# Patient Record
Sex: MALE | Race: WHITE | Employment: OTHER | URBAN - METROPOLITAN AREA
[De-identification: names, ages, dates, MRNs, and addresses within clinical notes are randomized per-mention and may not be internally consistent; named-entity substitution may affect disease eponyms.]

---

## 2017-06-23 ENCOUNTER — APPOINTMENT (OUTPATIENT)
Dept: GENERAL RADIOLOGY | Age: 82
End: 2017-06-23
Attending: EMERGENCY MEDICINE
Payer: MEDICARE

## 2017-06-23 ENCOUNTER — HOSPITAL ENCOUNTER (EMERGENCY)
Age: 82
Discharge: HOME | End: 2017-06-23
Attending: EMERGENCY MEDICINE | Admitting: EMERGENCY MEDICINE
Payer: MEDICARE

## 2017-06-23 VITALS
SYSTOLIC BLOOD PRESSURE: 168 MMHG | DIASTOLIC BLOOD PRESSURE: 71 MMHG | HEIGHT: 74 IN | OXYGEN SATURATION: 99 % | HEART RATE: 80 BPM | RESPIRATION RATE: 16 BRPM | TEMPERATURE: 97.9 F

## 2017-06-23 DIAGNOSIS — M19.90 ARTHRITIS: Primary | ICD-10-CM

## 2017-06-23 DIAGNOSIS — M79.605 LEG PAIN, CENTRAL, LEFT: ICD-10-CM

## 2017-06-23 LAB
ALBUMIN SERPL BCP-MCNC: 3.4 G/DL (ref 3.5–5)
ALBUMIN/GLOB SERPL: 0.9 {RATIO} (ref 1.1–2.2)
ALP SERPL-CCNC: 81 U/L (ref 45–117)
ALT SERPL-CCNC: 28 U/L (ref 12–78)
ANION GAP BLD CALC-SCNC: 7 MMOL/L (ref 5–15)
AST SERPL W P-5'-P-CCNC: 28 U/L (ref 15–37)
BASOPHILS # BLD AUTO: 0 K/UL (ref 0–0.1)
BASOPHILS # BLD: 0 % (ref 0–1)
BILIRUB SERPL-MCNC: 0.4 MG/DL (ref 0.2–1)
BUN SERPL-MCNC: 54 MG/DL (ref 6–20)
BUN/CREAT SERPL: 23 (ref 12–20)
CALCIUM SERPL-MCNC: 9.2 MG/DL (ref 8.5–10.1)
CHLORIDE SERPL-SCNC: 102 MMOL/L (ref 97–108)
CO2 SERPL-SCNC: 28 MMOL/L (ref 21–32)
CREAT SERPL-MCNC: 2.35 MG/DL (ref 0.7–1.3)
EOSINOPHIL # BLD: 0.1 K/UL (ref 0–0.4)
EOSINOPHIL NFR BLD: 2 % (ref 0–7)
ERYTHROCYTE [DISTWIDTH] IN BLOOD BY AUTOMATED COUNT: 15.7 % (ref 11.5–14.5)
GLOBULIN SER CALC-MCNC: 3.9 G/DL (ref 2–4)
GLUCOSE SERPL-MCNC: 85 MG/DL (ref 65–100)
HCT VFR BLD AUTO: 37.3 % (ref 36.6–50.3)
HGB BLD-MCNC: 12.2 G/DL (ref 12.1–17)
LYMPHOCYTES # BLD AUTO: 19 % (ref 12–49)
LYMPHOCYTES # BLD: 1.4 K/UL (ref 0.8–3.5)
MCH RBC QN AUTO: 28.8 PG (ref 26–34)
MCHC RBC AUTO-ENTMCNC: 32.7 G/DL (ref 30–36.5)
MCV RBC AUTO: 88.2 FL (ref 80–99)
MONOCYTES # BLD: 0.7 K/UL (ref 0–1)
MONOCYTES NFR BLD AUTO: 10 % (ref 5–13)
NEUTS SEG # BLD: 5.3 K/UL (ref 1.8–8)
NEUTS SEG NFR BLD AUTO: 69 % (ref 32–75)
PLATELET # BLD AUTO: 232 K/UL (ref 150–400)
POTASSIUM SERPL-SCNC: 4.1 MMOL/L (ref 3.5–5.1)
PROT SERPL-MCNC: 7.3 G/DL (ref 6.4–8.2)
RBC # BLD AUTO: 4.23 M/UL (ref 4.1–5.7)
SODIUM SERPL-SCNC: 137 MMOL/L (ref 136–145)
URATE SERPL-MCNC: 7.2 MG/DL (ref 3.5–7.2)
WBC # BLD AUTO: 7.6 K/UL (ref 4.1–11.1)

## 2017-06-23 PROCEDURE — 93971 EXTREMITY STUDY: CPT

## 2017-06-23 PROCEDURE — 99284 EMERGENCY DEPT VISIT MOD MDM: CPT

## 2017-06-23 PROCEDURE — 80053 COMPREHEN METABOLIC PANEL: CPT | Performed by: EMERGENCY MEDICINE

## 2017-06-23 PROCEDURE — 85025 COMPLETE CBC W/AUTO DIFF WBC: CPT | Performed by: EMERGENCY MEDICINE

## 2017-06-23 PROCEDURE — 74011250636 HC RX REV CODE- 250/636: Performed by: EMERGENCY MEDICINE

## 2017-06-23 PROCEDURE — 84550 ASSAY OF BLOOD/URIC ACID: CPT | Performed by: EMERGENCY MEDICINE

## 2017-06-23 PROCEDURE — 73552 X-RAY EXAM OF FEMUR 2/>: CPT

## 2017-06-23 PROCEDURE — 74011250637 HC RX REV CODE- 250/637: Performed by: EMERGENCY MEDICINE

## 2017-06-23 PROCEDURE — 36415 COLL VENOUS BLD VENIPUNCTURE: CPT | Performed by: EMERGENCY MEDICINE

## 2017-06-23 RX ORDER — HYDROCODONE BITARTRATE AND ACETAMINOPHEN 5; 325 MG/1; MG/1
1 TABLET ORAL ONCE
Status: COMPLETED | OUTPATIENT
Start: 2017-06-23 | End: 2017-06-23

## 2017-06-23 RX ORDER — METHYLPREDNISOLONE 4 MG/1
8 TABLET ORAL
Status: DISCONTINUED | OUTPATIENT
Start: 2017-06-24 | End: 2017-06-23

## 2017-06-23 RX ORDER — METHYLPREDNISOLONE 4 MG/1
8 TABLET ORAL
Status: COMPLETED | OUTPATIENT
Start: 2017-06-23 | End: 2017-06-23

## 2017-06-23 RX ORDER — METHYLPREDNISOLONE 4 MG/1
TABLET ORAL
Qty: 1 DOSE PACK | Refills: 0 | Status: ON HOLD | OUTPATIENT
Start: 2017-06-23 | End: 2017-12-28

## 2017-06-23 RX ORDER — HYDROCODONE BITARTRATE AND ACETAMINOPHEN 5; 325 MG/1; MG/1
1 TABLET ORAL
Qty: 20 TAB | Refills: 0 | Status: ON HOLD | OUTPATIENT
Start: 2017-06-23 | End: 2017-12-28

## 2017-06-23 RX ADMIN — HYDROCODONE BITARTRATE AND ACETAMINOPHEN 1 TABLET: 5; 325 TABLET ORAL at 17:28

## 2017-06-23 RX ADMIN — METHYLPREDNISOLONE 8 MG: 4 TABLET ORAL at 17:49

## 2017-06-23 NOTE — PROCEDURES
[facility]  *** FINAL REPORT ***    Name: Shannon Duncan  MRN: [id number]  : 1920  HIS Order #: [id number]  [id number] [facility] Visit #: [id number]  Date: 2017    TYPE OF TEST: Peripheral Venous Testing    REASON FOR TEST  Pain in limb    Left Leg:-  Deep venous thrombosis:           No  Superficial venous thrombosis:    No  Deep venous insufficiency:        Not examined  Superficial venous insufficiency: Not examined      INTERPRETATION/FINDINGS  PROCEDURE:  Color duplex ultrasound imaging of lower extremity veins. FINDINGS:       Right: The common femoral vein is patent and without evidence of  thrombus. Phasic flow is observed. This extremity was not otherwise  evaluated. Left:   The common femoral, deep femoral, femoral, popliteal,  posterior tibial, peroneal, and great saphenous are patent and without   evidence of thrombus;  each is fully compressible and there is no  narrowing of the flow channel on color Doppler imaging. Phasic flow  is observed in the common femoral vein. IMPRESSION:  No evidence of left lower extremity vein thrombosis. ADDITIONAL COMMENTS    I have personally reviewed the data relevant to the interpretation of  this  study.     TECHNOLOGIST: Jeremiah Guidry RVT  Signed: 2017 01:51 PM    PHYSICIAN: Sulma Coyne MD  Signed: 2017 07:41 AM

## 2017-06-23 NOTE — PROGRESS NOTES
SSED/CM consult received and appreciated. EMR reviewed. History significant for allergic rhinitis, cough, anemia, chronic kidney disease, arthritis, syncope, osteoarthritis, hypercholesterolemia, actinic keratosis, cervical disc disorder, HTN, benign prostatic hypertrophy, GERD, and gout . Patient present to the ED w/ leg pain. Case discussed w/  regarding transitional care options. Met w/patient and son/POA Rana Jeans - introduced to role of CM. Patient had a fall 2 weeks prior and lives home independently . DME includes RW. Son and daughter-n-law traveled to [place] ([address]) on 6/14/17 and drove patient back to [address] [facility]. Patient has family support including Jason Radon (son/POA). Son acknowledges pain has continued to increase and concerned about patient at night and if able to bear weight. CM discussed options from the ED if patient discharged - OP Rehab [facility] / Community Evaluation for [employer] or HH. Prior to ED visit patient has not been homebound. Celso Mancuso asked about hospitalization - CM informed patient would need to meet medical necessity for admission by Physician. Noted patient to be administered pain medication and steroid then mobility will be observed by Nursing. Previous provider includes [facility] provided son w/ brochure and informed if patient d/cd home can call facility on Monday morning and requests Home Assessment. CM provided  w/ RX for [facility]. Current address/contact will be Rana Jeans / [facility] [address] / [phone] (m). CM able to confirm PCP - Stanley Beckwith / [facility]) Internal Medicine / [phone]. [facility] Medicare A/B and [employer] are insurance providers. Care Management Interventions  PCP Verified by CM: Yes (Stanley Beckwith)  Mode of Transport at Discharge:  Other (see comment) (car)  Hospital Transport Time of Discharge: 1211  Transition of Care Consult (CM Consult): Discharge Planning  MyChart Signup: No  Discharge Durable Medical Equipment: No  Physical Therapy Consult: Yes  Occupational Therapy Consult: No  Speech Therapy Consult: No  Current Support Network: Relative's Home  Confirm Follow Up Transport: Family  Plan discussed with Pt/Family/Caregiver: Yes  Freedom of Choice Offered: Yes  Discharge Location  Discharge Placement:  (TBD)

## 2017-06-23 NOTE — ED TRIAGE NOTES
TRIAGE NOTE: Patient reports left upper leg pain and muscular tenderness after fall 2 weeks ago. Also with right hand pain, patient suspects gout or arthritis. Patient with swelling in knuckles on right 3rd-5th fingers. Patient reports not taking medication for gout, only given pain medication for when it hurts.

## 2017-06-23 NOTE — Clinical Note
Home to rest and use the medications as directed for pain and inflammation. You will need to followup with own MD in the next 4-6 days if no improvement in symptoms. Also, follow up with [facility] for PT evaluation. Take the order form with you.

## 2017-06-23 NOTE — ED NOTES
Pt ambulates around the nurse's station using cane with steady gait noted. Pt denies pain during ambulation.

## 2017-06-23 NOTE — ED PROVIDER NOTES
HPI Comments: 80 y.o. male with past medical history significant for allergic rhinitis, cough, anemia, chronic kidney disease, arthritis, syncope, osteoarthritis, hypercholesterolemia, actinic keratosis, cervical disc disorder, HTN, benign prostatic hypertrophy, GERD, and gout who presents from home with chief complaint of leg pain. Pt reports having swelling and pain in his left upper leg along with swelling in his right hand. Pt states that the swelling in his leg has been there since a fall approximately 2 weeks ago. He states that he is unable to walk or bear weight on his left leg due to the pain and swelling. Pt has been taking pain medication and Allopurinol. He denies having any back pain. No fever or chills. No prior history of similar difficulties is noted. No SOB or chest pain noted. There are no other acute medical concerns at this time. Social hx: nonsmoker, no EtOH use, no drug use  PCP: Fracisco Francisco MD    Note written by Chery Benz, as dictated by Rosaline Hook MD 12:47 PM    The history is provided by the patient. No  was used. Past Medical History:   Diagnosis Date    Actinic keratosis     Allergic rhinitis     Anemia     Arthritis     C-spine degeneration    Benign prostatic hypertrophy     Bundle branch block     Cervical disc disorder     Chronic kidney disease     \"renal disorder\"    Cough     GERD (gastroesophageal reflux disease)     Gout     Hypercholesterolemia     Hypertension     Kidney disease     Osteoarthritis     Syncope        History reviewed. No pertinent surgical history. History reviewed. No pertinent family history. Social History     Social History    Marital status:      Spouse name: N/A    Number of children: N/A    Years of education: N/A     Occupational History    Not on file.      Social History Main Topics    Smoking status: Never Smoker    Smokeless tobacco: Not on file    Alcohol use No  Drug use: No    Sexual activity: Not on file     Other Topics Concern    Not on file     Social History Narrative         ALLERGIES: Pamelor [nortriptyline]; Pcn [penicillins]; and Sulfa (sulfonamide antibiotics)    Review of Systems   Constitutional: Negative for activity change, appetite change and fatigue. HENT: Negative for ear pain, facial swelling, sore throat and trouble swallowing. Eyes: Negative for pain, discharge and visual disturbance. Respiratory: Negative for chest tightness, shortness of breath and wheezing. Cardiovascular: Negative for chest pain and palpitations. Gastrointestinal: Negative for abdominal pain, blood in stool, nausea and vomiting. Genitourinary: Negative for difficulty urinating, flank pain and hematuria. Musculoskeletal: Positive for arthralgias, joint swelling and myalgias. Negative for back pain and neck pain. Skin: Negative for color change and rash. Neurological: Negative for dizziness, weakness, numbness and headaches. Hematological: Negative for adenopathy. Does not bruise/bleed easily. Psychiatric/Behavioral: Negative for behavioral problems, confusion and sleep disturbance. All other systems reviewed and are negative. Vitals:    06/23/17 1217   BP: 119/68   Pulse: 76   Resp: 16   Temp: 97.6 °F (36.4 °C)   SpO2: 100%   Height: 6' 2\" (1.88 m)            Physical Exam   Constitutional: He is oriented to person, place, and time. He appears well-developed and well-nourished. No distress. HENT:   Head: Normocephalic and atraumatic. Nose: Nose normal.   Mouth/Throat: Oropharynx is clear and moist.   Eyes: Conjunctivae and EOM are normal. Pupils are equal, round, and reactive to light. No scleral icterus. Neck: Normal range of motion. Neck supple. No JVD present. No tracheal deviation present. No thyromegaly present. No carotid bruits noted. Cardiovascular: Normal rate, regular rhythm, normal heart sounds and intact distal pulses.   Exam reveals no gallop and no friction rub. No murmur heard. Pulmonary/Chest: Effort normal and breath sounds normal. No respiratory distress. He has no wheezes. He has no rales. He exhibits no tenderness. Abdominal: Soft. Bowel sounds are normal. He exhibits no distension and no mass. There is no tenderness. There is no rebound and no guarding. Musculoskeletal: Normal range of motion. He exhibits tenderness (Some mild tenderness in the left upper leg and left hip. No deformity. Discomfort is minimal. ). He exhibits no edema. Right hand: He exhibits swelling (fusiform swelling over proximal IP joint on 3rd and 4th finger). Left upper leg: He exhibits tenderness. Lymphadenopathy:     He has no cervical adenopathy. Neurological: He is alert and oriented to person, place, and time. He has normal reflexes. No cranial nerve deficit. Coordination normal.   Skin: Skin is warm and dry. No rash noted. No erythema. Psychiatric: He has a normal mood and affect. His behavior is normal. Judgment and thought content normal.   Nursing note and vitals reviewed.    Note written by Chery Ortiz, as dictated by Rufina Bronson MD 12:47 PM    MDM  Number of Diagnoses or Management Options  Arthritis: new and requires workup  Leg pain, central, left: new and requires workup     Amount and/or Complexity of Data Reviewed  Clinical lab tests: ordered and reviewed  Tests in the radiology section of CPT®: ordered and reviewed  Decide to obtain previous medical records or to obtain history from someone other than the patient: yes  Review and summarize past medical records: yes  Discuss the patient with other providers: yes  Independent visualization of images, tracings, or specimens: yes    Risk of Complications, Morbidity, and/or Mortality  Presenting problems: moderate  Diagnostic procedures: moderate  Management options: moderate    Patient Progress  Patient progress: stable    ED Course Procedures  PROGRESS NOTE:  2:19 PM Will consult nephrologist regarding abnormalities in patient's renal function. CONSULT NOTE:  3:33 PM Mima Bell MD spoke with Dr. Yoselin Barclay, Consult for Nephrology. Discussed available diagnostic tests and clinical findings. He is in agreement with care plans as outlined. Dr. Yoselin Barclay finds no acute changes. 4:40 PM  Still awaiting PT to see patient. Have discussed with the patient the need for medications for inflammation and pain. The family is interested in the patient receiving PT as this has helped him in the past.    He is given a dose of medrol and Norco for discomfort. Need PT for disposition. PT has been repaged   Will attempt to ambulate 20 minutes after medications. Patient ambulated without difficulty. No clear etiology for the patient's symptoms noted. Asymptomatic at this point. Will discharge home with symptomatic treatment and have the patient follow up with own MD or return if worsening of symptoms.

## 2017-06-24 ENCOUNTER — APPOINTMENT (OUTPATIENT)
Dept: CT IMAGING | Age: 82
End: 2017-06-24
Attending: EMERGENCY MEDICINE
Payer: MEDICARE

## 2017-06-24 ENCOUNTER — HOSPITAL ENCOUNTER (EMERGENCY)
Age: 82
Discharge: HOME | End: 2017-06-24
Attending: EMERGENCY MEDICINE
Payer: MEDICARE

## 2017-06-24 VITALS
RESPIRATION RATE: 22 BRPM | WEIGHT: 176 LBS | DIASTOLIC BLOOD PRESSURE: 61 MMHG | HEART RATE: 84 BPM | OXYGEN SATURATION: 94 % | BODY MASS INDEX: 22.59 KG/M2 | HEIGHT: 74 IN | TEMPERATURE: 97.4 F | SYSTOLIC BLOOD PRESSURE: 127 MMHG

## 2017-06-24 DIAGNOSIS — M54.32 SCIATICA OF LEFT SIDE: Primary | ICD-10-CM

## 2017-06-24 LAB
CK SERPL-CCNC: 79 U/L (ref 39–308)
ERYTHROCYTE [SEDIMENTATION RATE] IN BLOOD: 45 MM/HR (ref 0–20)

## 2017-06-24 PROCEDURE — 73700 CT LOWER EXTREMITY W/O DYE: CPT

## 2017-06-24 PROCEDURE — 82550 ASSAY OF CK (CPK): CPT | Performed by: EMERGENCY MEDICINE

## 2017-06-24 PROCEDURE — 96375 TX/PRO/DX INJ NEW DRUG ADDON: CPT

## 2017-06-24 PROCEDURE — 85652 RBC SED RATE AUTOMATED: CPT | Performed by: EMERGENCY MEDICINE

## 2017-06-24 PROCEDURE — 96374 THER/PROPH/DIAG INJ IV PUSH: CPT

## 2017-06-24 PROCEDURE — 74011000250 HC RX REV CODE- 250: Performed by: EMERGENCY MEDICINE

## 2017-06-24 PROCEDURE — 99285 EMERGENCY DEPT VISIT HI MDM: CPT

## 2017-06-24 PROCEDURE — 36415 COLL VENOUS BLD VENIPUNCTURE: CPT | Performed by: EMERGENCY MEDICINE

## 2017-06-24 PROCEDURE — 74011250636 HC RX REV CODE- 250/636: Performed by: EMERGENCY MEDICINE

## 2017-06-24 PROCEDURE — 93926 LOWER EXTREMITY STUDY: CPT

## 2017-06-24 RX ORDER — HYDROMORPHONE HYDROCHLORIDE 1 MG/ML
0.5 INJECTION, SOLUTION INTRAMUSCULAR; INTRAVENOUS; SUBCUTANEOUS
Status: COMPLETED | OUTPATIENT
Start: 2017-06-24 | End: 2017-06-24

## 2017-06-24 RX ORDER — MORPHINE SULFATE 4 MG/ML
4 INJECTION, SOLUTION INTRAMUSCULAR; INTRAVENOUS ONCE
Status: COMPLETED | OUTPATIENT
Start: 2017-06-24 | End: 2017-06-24

## 2017-06-24 RX ORDER — ONDANSETRON 2 MG/ML
2 INJECTION INTRAMUSCULAR; INTRAVENOUS
Status: COMPLETED | OUTPATIENT
Start: 2017-06-24 | End: 2017-06-24

## 2017-06-24 RX ADMIN — HYDROMORPHONE HYDROCHLORIDE 0.5 MG: 1 INJECTION, SOLUTION INTRAMUSCULAR; INTRAVENOUS; SUBCUTANEOUS at 11:24

## 2017-06-24 RX ADMIN — Medication 4 MG: at 10:19

## 2017-06-24 RX ADMIN — PROCHLORPERAZINE EDISYLATE 5 MG: 5 INJECTION INTRAMUSCULAR; INTRAVENOUS at 11:24

## 2017-06-24 RX ADMIN — ONDANSETRON 2 MG: 2 INJECTION INTRAMUSCULAR; INTRAVENOUS at 10:20

## 2017-06-24 NOTE — PROCEDURES
[facility]  *** FINAL REPORT ***    Name: Jayshree Obando  MRN: [id number]  : 1920  HIS Order #: [id number]  [id number] [facility] Visit #: [id number]  Date: 2017    TYPE OF TEST: Extremity Arterial Duplex    REASON FOR TEST  diminished LLE doppler pulse                            Right                     Left  Artery               PSV   Finding             PSV   Finding  ------------------  -----  ---------------    -----  ---------------  External iliac:  Common femoral:                               101.0  Profunda femoris:                              69.0  Proximal SFA:                             65.0  Mid SFA:  Distal SFA:                               57.0  Popliteal:  Anterior tibial:                               17.0  Posterior tibial:                              17.0    Pressures               Right     Left               -----     -----     Brachial:           DP:           PT:            DAV:            Toe: INTERPRETATION/FINDINGS  PROCEDURE:  Color duplex ultrasound imaging of lower extremity  arteries. The exam may include pulse volume recording (PVR)  plethysmography at the ankle and/or measurement of systolic blood  pressure at the ankle and brachial level with calculation of the  ankle/brachial pressure index (DAV), if indicated. FINDINGS:  The left common femoral, deep femoral, superficial femoral,   popliteal, posterior tibial, and anterior tibial arteries are  visualized. Color Doppler imaging demonstrates no significant  narrowing above the knee of the arterial flow channel. Velocity is  not significantly elevated. Below the knee, the distal posterior  tibial and anterior tibial arteries contain low monophasic flow. 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 ADDITIONAL COMMENTS    I have personally reviewed the data relevant to the interpretation of  this  study.     TECHNOLOGIST: Emmett Osman, RVT  Signed: 06/24/2017 11:50 AM    PHYSICIAN: Rayna Carlton MD  Signed: 06/26/2017 07:36 AM

## 2017-06-24 NOTE — DISCHARGE INSTRUCTIONS
Sciatica: Care Instructions  Your Care Instructions    Sciatica (say \"hwe-AI-sz-kuh\") is an irritation of one of the sciatic nerves, which come from the spinal cord in the lower back. The sciatic nerves and their branches extend down through the buttock to the foot. Sciatica can develop when an injured disc in the back presses against a spinal nerve root. Its main symptom is pain, numbness, or weakness that is often worse in the leg or foot than in the back. Sciatica often will improve and go away with time. Early treatment usually includes medicines and exercises to relieve pain. Follow-up care is a key part of your treatment and safety. Be sure to make and go to all appointments, and call your doctor if you are having problems. It's also a good idea to know your test results and keep a list of the medicines you take. How can you care for yourself at home? · Take pain medicines exactly as directed. ¨ If the doctor gave you a prescription medicine for pain, take it as prescribed. ¨ If you are not taking a prescription pain medicine, ask your doctor if you can take an over-the-counter medicine. · Use heat or ice to relieve pain. ¨ To apply heat, put a warm water bottle, heating pad set on low, or warm cloth on your back. Do not go to sleep with a heating pad on your skin. ¨ To use ice, put ice or a cold pack on the area for 10 to 20 minutes at a time. Put a thin cloth between the ice and your skin. · Avoid sitting if possible, unless it feels better than standing. · Alternate lying down with short walks. Increase your walking distance as you are able to without making your symptoms worse. · Do not do anything that makes your symptoms worse. When should you call for help? Call 911 anytime you think you may need emergency care. For example, call if:  · You are unable to move a leg at all.   Call your doctor now or seek immediate medical care if:  · You have new or worse symptoms in your legs or buttocks. Symptoms may include:  ¨ Numbness or tingling. ¨ Weakness. ¨ Pain. · You lose bladder or bowel control. Watch closely for changes in your health, and be sure to contact your doctor if:  · You are not getting better as expected. Where can you learn more? Go to http://js-rachel.info/. Enter [phone] in the search box to learn more about \"Sciatica: Care Instructions. \"  Current as of: March 21, 2017  Content Version: 11.3  © 2941-2686 [employer]. Care instructions adapted under license by [employer] (which disclaims liability or warranty for this information). If you have questions about a medical condition or this instruction, always ask your healthcare professional. [address] any warranty or liability for your use of this information. We hope that we have addressed all of your medical concerns. The examination and treatment you received in the Emergency Department were for an emergent problem and were not intended as complete care. It is important that you follow up with your healthcare provider(s) for ongoing care. If your symptoms worsen or do not improve as expected, and you are unable to reach your usual health care provider(s), you should return to the Emergency Department. Today's healthcare is undergoing tremendous change, and patient satisfaction surveys are one of the many tools to assess the quality of medical care. You may receive a survey from the [employer] regarding your experience in the Emergency Department. I hope that your experience has been completely positive, particularly the medical care that I provided. As such, please participate in the survey; anything less than excellent does not meet my expectations or intentions. [facility] and [facility] participate in nationally recognized quality of care measures.   If your blood pressure is greater than 120/80, as reported below, we urge that you seek medical care to address the potential of high blood pressure, commonly known as hypertension. Hypertension can be hereditary or can be caused by certain medical conditions, pain, stress, or \"white coat syndrome. \"       Please make an appointment with your health care provider(s) for follow up of your Emergency Department visit. VITALS:   Patient Vitals for the past 8 hrs:   Temp Pulse Resp BP SpO2   06/24/17 0917 97.4 °F (36.3 °C) 84 22 161/81 100 %          Thank you for allowing us to provide you with medical care today. We realize that you have many choices for your emergency care needs. Please choose us in the future for any continued health care needs. Janett Taylor MD    [address] [place] [address].   Office: [phone]            Recent Results (from the past 24 hour(s))   CBC WITH AUTOMATED DIFF    Collection Time: 06/23/17  1:14 PM   Result Value Ref Range    WBC 7.6 4.1 - 11.1 K/uL    RBC 4.23 4. 10 - 5.70 M/uL    HGB 12.2 12.1 - 17.0 g/dL    HCT 37.3 36.6 - 50.3 %    MCV 88.2 80.0 - 99.0 FL    MCH 28.8 26.0 - 34.0 PG    MCHC 32.7 30.0 - 36.5 g/dL    RDW 15.7 (H) 11.5 - 14.5 %    PLATELET 453 481 - 922 K/uL    NEUTROPHILS 69 32 - 75 %    LYMPHOCYTES 19 12 - 49 %    MONOCYTES 10 5 - 13 %    EOSINOPHILS 2 0 - 7 %    BASOPHILS 0 0 - 1 %    ABS. NEUTROPHILS 5.3 1.8 - 8.0 K/UL    ABS. LYMPHOCYTES 1.4 0.8 - 3.5 K/UL    ABS. MONOCYTES 0.7 0.0 - 1.0 K/UL    ABS. EOSINOPHILS 0.1 0.0 - 0.4 K/UL    ABS.  BASOPHILS 0.0 0.0 - 0.1 K/UL   METABOLIC PANEL, COMPREHENSIVE    Collection Time: 06/23/17  1:14 PM   Result Value Ref Range    Sodium 137 136 - 145 mmol/L    Potassium 4.1 3.5 - 5.1 mmol/L    Chloride 102 97 - 108 mmol/L    CO2 28 21 - 32 mmol/L    Anion gap 7 5 - 15 mmol/L    Glucose 85 65 - 100 mg/dL    BUN 54 (H) 6 - 20 MG/DL    Creatinine 2.35 (H) 0.70 - 1.30 MG/DL    BUN/Creatinine ratio 23 (H) 12 - 20 GFR est AA 31 (L) >60 ml/min/1.73m2    GFR est non-AA 26 (L) >60 ml/min/1.73m2    Calcium 9.2 8.5 - 10.1 MG/DL    Bilirubin, total 0.4 0.2 - 1.0 MG/DL    ALT (SGPT) 28 12 - 78 U/L    AST (SGOT) 28 15 - 37 U/L    Alk. phosphatase 81 45 - 117 U/L    Protein, total 7.3 6.4 - 8.2 g/dL    Albumin 3.4 (L) 3.5 - 5.0 g/dL    Globulin 3.9 2.0 - 4.0 g/dL    A-G Ratio 0.9 (L) 1.1 - 2.2     URIC ACID    Collection Time: 06/23/17  1:14 PM   Result Value Ref Range    Uric acid 7.2 3.5 - 7.2 MG/DL   SED RATE (ESR)    Collection Time: 06/24/17 10:12 AM   Result Value Ref Range    Sed rate, automated 45 (H) 0 - 20 mm/hr   CK    Collection Time: 06/24/17 10:12 AM   Result Value Ref Range    CK 79 39 - 308 U/L       Xr Femur Lt 2 V    Result Date: 6/23/2017  EXAM:  XR FEMUR LT 2 V INDICATION:   Left femur pain following trauma. COMPARISON: None. FINDINGS: Four images which comprise 2 views of the left femur demonstrate no fracture or other acute osseous, articular or soft tissue abnormality. There is vascular calcification. There is a lot of artifact on the images. IMPRESSION:  No fracture or acute abnormality. .     Ct Low Ext Lt Wo Cont    Result Date: 6/24/2017  ****PRELIMINARY REPORT**** No fracture or acute abnormality. Degenerative changes of the spine and hips. Preliminary report was provided by Dr. Savana Alarcon, the on-call radiologist, at 1017 hours. Final report to follow.  ****END PRELIMINARY REPORT****

## 2017-06-24 NOTE — ED TRIAGE NOTES
Pt arrives via EMS from home with c/o L leg pain. Pt was seen here yesterday for the same and then discharged after having xray and doppler. Pt given 100mcg fentanyl en route.

## 2017-06-24 NOTE — CONSULTS
ORTHO CONSULT NOTE    Subjective:     Date of Consultation:  June 24, 2017  Referring Physician: Dr. Alex Bass is a 80 y.o. male who is being seen for left thigh pain. He reports a fall about 2 weeks ago. He reports intermittent pain from the hip to the knee. He was seen here in the ER yesterday for this same issue. Xray and venous doppler were negative and he was able walk. He was discharged home. He returns this morning with ongoing symptoms. He denies bowel/bladder incontinence, saddle anesthesia, or lower extremity numbness/tingling. He denies groin pain. He localizes his pain primarily to the mid thigh. It does seem worse with WB but improves when he pulls his knee to his chest. Pain comes and goes. Today, a CT of his left LE was negative for fracture. He does have significant DDD in the visualized lumbar spine. Arterial doppler was also performed today. 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 Patient Active Problem List    Diagnosis Date Noted    Fatigue 11/18/2013     History reviewed. No pertinent family history. Social History   Substance Use Topics    Smoking status: Never Smoker    Smokeless tobacco: Not on file    Alcohol use No     Past Medical History:   Diagnosis Date    Actinic keratosis     Allergic rhinitis     Anemia     Arthritis     C-spine degeneration    Benign prostatic hypertrophy     Bundle branch block     Cervical disc disorder     Chronic kidney disease     \"renal disorder\"    Cough     GERD (gastroesophageal reflux disease)     Gout     Hypercholesterolemia     Hypertension     Kidney disease     Osteoarthritis     Syncope       History reviewed. No pertinent surgical history. Prior to Admission medications    Medication Sig Start Date End Date Taking?  Authorizing Provider   methylPREDNISolone (MEDROL, MOMO,) 4 mg tablet Take as directed for inflammation 6/23/17   Mandie Barba MD   HYDROcodone-acetaminophen [facility]) 5-325 mg per tablet Take 1 Tab by mouth every four (4) hours as needed for Pain. Max Daily Amount: 6 Tabs. 6/23/17   Mandie Barba MD   povidone-iodine (BETADINE) 10 % external solution To be appied locally on the foot twice daily 11/21/13   Rena Cintron MD   carvedilol (COREG) 3.125 mg tablet Take 3.125 mg by mouth two (2) times daily (with meals). Fracisco Francisco MD   finasteride (PROSCAR) 5 mg tablet Take 5 mg by mouth daily. Fracisco Francisco MD   amitriptyline (ELAVIL) 25 mg tablet Take 25 mg by mouth nightly. Fracisco Francisco MD   aspirin (ASPIRIN) 325 mg tablet Take 325 mg by mouth every four (4) hours as needed for Pain. Fracisco Francisco MD     No current facility-administered medications for this encounter. Current Outpatient Prescriptions   Medication Sig    methylPREDNISolone (MEDROL, MOMO,) 4 mg tablet Take as directed for inflammation    HYDROcodone-acetaminophen (NORCO) 5-325 mg per tablet Take 1 Tab by mouth every four (4) hours as needed for Pain. Max Daily Amount: 6 Tabs.  povidone-iodine (BETADINE) 10 % external solution To be appied locally on the foot twice daily    carvedilol (COREG) 3.125 mg tablet Take 3.125 mg by mouth two (2) times daily (with meals).  finasteride (PROSCAR) 5 mg tablet Take 5 mg by mouth daily.  amitriptyline (ELAVIL) 25 mg tablet Take 25 mg by mouth nightly.  aspirin (ASPIRIN) 325 mg tablet Take 325 mg by mouth every four (4) hours as needed for Pain. Allergies   Allergen Reactions    Pamelor [Nortriptyline] Unknown (comments)    Pcn [Penicillins] Unknown (comments)    Sulfa (Sulfonamide Antibiotics) Unknown (comments)        Review of Systems:  A comprehensive review of systems was negative except for that written in the HPI.     Mental Status: no dementia    Objective:     Patient Vitals for the past 8 hrs:   BP Temp Pulse Resp SpO2 Height Weight   06/24/17 0917 161/81 97.4 °F (36.3 °C) 84 22 100 % 6' 2\" (1.88 m) 79.8 kg (176 lb)     Temp (24hrs), Av.4 °F (36.3 °C), Min:97.4 °F (36.3 °C), Max:97.4 °F (36.3 °C)      PHYSICAL EXAM:   General: 79yo male, appears stated age, looks uncomfortable, pleasant/cooperative  Neurologic: alert/oriented, normal mood  Skin: no rash or abnormalities  Vascular: faint pedal pulses  Extremities:  No swelling left thigh, no deformity, no leg shortening, sensation intact throughout LE, no pain with log roll of either hip, with hips flexed there is some pain and limited ROM with passive IR/ER, negative SLR testing     Imaging Review:  XR FEMUR LT 2 V 2017  3:11 PM  INDICATION:   Left femur pain following trauma. COMPARISON: None. FINDINGS: Four images which comprise 2 views of the left femur demonstrate no  fracture or other acute osseous, articular or soft tissue abnormality. There is  vascular calcification. There is a lot of artifact on the images. IMPRESSION:  No fracture or acute abnormality. CT LOW EXT LT WO CONT 2017  9:57   PRELIMINARY REPORT  No fracture or acute abnormality. Degenerative changes of the spine and hips. Preliminary report was provided by Dr. Justin Cohen, the on-call radiologist, at 1017  hours. Final report to follow.    END PRELIMINARY REPORT    DUPLEX LOW EXT ARTERY LEFT 2017 11:50   TYPE OF TEST: Extremity Arterial Duplex     REASON FOR TEST  diminished LLE doppler pulse                             Right                     Left  Artery               PSV   Finding             PSV   Finding  ------------------  -----  ---------------    -----  ---------------  External iliac:  Common femoral:                               101.0  Profunda femoris:                              69.0  Proximal SFA:                             65.0  Mid SFA:  Distal SFA:                               57.0  Popliteal:  Anterior tibial:                               17.0  Posterior tibial: 17.0     Pressures               Right     Left               -----     -----     Brachial:           DP:           PT:             DAV:             Toe:        INTERPRETATION/FINDINGS  PROCEDURE:  Color duplex ultrasound imaging of lower extremity  arteries. The exam may include pulse volume recording (PVR)  plethysmography at the ankle and/or measurement of systolic blood  pressure at the ankle and brachial level with calculation of the  ankle/brachial pressure index (DAV), if indicated.     FINDINGS:  The left common femoral, deep femoral, superficial femoral,   popliteal, posterior tibial, and anterior tibial arteries are  visualized. Color Doppler imaging demonstrates no significant  narrowing above the knee of the arterial flow channel. Velocity is  not significantly elevated. Below the knee, the distal posterior  tibial and anterior tibial arteries contain low monophasic flow.     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 Labs:   Recent Results (from the past 24 hour(s))   CBC WITH AUTOMATED DIFF    Collection Time: 06/23/17  1:14 PM   Result Value Ref Range    WBC 7.6 4.1 - 11.1 K/uL    RBC 4.23 4. 10 - 5.70 M/uL    HGB 12.2 12.1 - 17.0 g/dL    HCT 37.3 36.6 - 50.3 %    MCV 88.2 80.0 - 99.0 FL    MCH 28.8 26.0 - 34.0 PG    MCHC 32.7 30.0 - 36.5 g/dL    RDW 15.7 (H) 11.5 - 14.5 %    PLATELET 418 513 - 973 K/uL    NEUTROPHILS 69 32 - 75 %    LYMPHOCYTES 19 12 - 49 %    MONOCYTES 10 5 - 13 %    EOSINOPHILS 2 0 - 7 %    BASOPHILS 0 0 - 1 %    ABS. NEUTROPHILS 5.3 1.8 - 8.0 K/UL    ABS. LYMPHOCYTES 1.4 0.8 - 3.5 K/UL    ABS. MONOCYTES 0.7 0.0 - 1.0 K/UL    ABS. EOSINOPHILS 0.1 0.0 - 0.4 K/UL    ABS.  BASOPHILS 0.0 0.0 - 0.1 K/UL   METABOLIC PANEL, COMPREHENSIVE    Collection Time: 06/23/17  1:14 PM   Result Value Ref Range    Sodium 137 136 - 145 mmol/L    Potassium 4.1 3.5 - 5.1 mmol/L    Chloride 102 97 - 108 mmol/L    CO2 28 21 - 32 mmol/L Anion gap 7 5 - 15 mmol/L    Glucose 85 65 - 100 mg/dL    BUN 54 (H) 6 - 20 MG/DL    Creatinine 2.35 (H) 0.70 - 1.30 MG/DL    BUN/Creatinine ratio 23 (H) 12 - 20      GFR est AA 31 (L) >60 ml/min/1.73m2    GFR est non-AA 26 (L) >60 ml/min/1.73m2    Calcium 9.2 8.5 - 10.1 MG/DL    Bilirubin, total 0.4 0.2 - 1.0 MG/DL    ALT (SGPT) 28 12 - 78 U/L    AST (SGOT) 28 15 - 37 U/L    Alk. phosphatase 81 45 - 117 U/L    Protein, total 7.3 6.4 - 8.2 g/dL    Albumin 3.4 (L) 3.5 - 5.0 g/dL    Globulin 3.9 2.0 - 4.0 g/dL    A-G Ratio 0.9 (L) 1.1 - 2.2     URIC ACID    Collection Time: 06/23/17  1:14 PM   Result Value Ref Range    Uric acid 7.2 3.5 - 7.2 MG/DL   SED RATE (ESR)    Collection Time: 06/24/17 10:12 AM   Result Value Ref Range    Sed rate, automated 45 (H) 0 - 20 mm/hr   CK    Collection Time: 06/24/17 10:12 AM   Result Value Ref Range    CK 79 39 - 308 U/L         Impression:     Patient Active Problem List    Diagnosis Date Noted    Fatigue 11/18/2013     Active Problems:    * No active hospital problems. *      Plan:     No clear cause for left leg symptoms identified so far. Does not appear to be a fracture. Could be spinal stenosis. Recommend treat as such and see how he responds. Discharge home on medrol dose pack, pain medications, muscle relaxants. Discussed with attending orthopedist, Dr. Pennie Gaucher. Follow-up as outpatient with [facility] next week.       DOYLE Iyer

## 2017-06-24 NOTE — ED PROVIDER NOTES
HPI Comments: 80 y.o. male with past medical history significant for CKD, OA, HTN, cervical disc disorder, gout who presents from home via EMS accompanied by son with chief complaint of leg pain. Patient complains of left leg pain since he fell approx 2 weeks ago, stating it began again suddenly today around 0615 after he awoke and went to the bathroom. Patient states the pain spans from his knee up to his hip. Patient was seen at [facility] ED yesterday and had a normal XR and venus doppler. Patient states he was able to walk fine yesterday after being discharged. Patient states he had taken a Norco around 2200 before bed. Patient received 100 mcg Fentanyl en route. Patient is ambulatory with a cane and walker. Patient states he lives at home with his wife and has daily home help. Patient denies difficulty urinating, back pain, or abdominal pain. There are no other acute medical concerns at this time. Social hx: never smoker, no alcohol  PCP: Fracisco Francisco MD    Note written by Chery Hassan, as dictated by Jeancarlos Mujica MD 9:29 AM      The history is provided by the patient. No  was used. Past Medical History:   Diagnosis Date    Actinic keratosis     Allergic rhinitis     Anemia     Arthritis     C-spine degeneration    Benign prostatic hypertrophy     Bundle branch block     Cervical disc disorder     Chronic kidney disease     \"renal disorder\"    Cough     GERD (gastroesophageal reflux disease)     Gout     Hypercholesterolemia     Hypertension     Kidney disease     Osteoarthritis     Syncope        History reviewed. No pertinent surgical history. History reviewed. No pertinent family history. Social History     Social History    Marital status:      Spouse name: N/A    Number of children: N/A    Years of education: N/A     Occupational History    Not on file.      Social History Main Topics    Smoking status: Never Smoker    Smokeless tobacco: Not on file    Alcohol use No    Drug use: No    Sexual activity: Not on file     Other Topics Concern    Not on file     Social History Narrative         ALLERGIES: Pamelor [nortriptyline]; Pcn [penicillins]; and Sulfa (sulfonamide antibiotics)    Review of Systems   Constitutional: Negative for appetite change, chills and fever. HENT: Negative for rhinorrhea, sore throat and trouble swallowing. Eyes: Negative for photophobia. Respiratory: Negative for cough and shortness of breath. Cardiovascular: Negative for chest pain and palpitations. Gastrointestinal: Negative for abdominal pain, nausea and vomiting. Genitourinary: Negative for difficulty urinating, dysuria, frequency and hematuria. Musculoskeletal: Positive for arthralgias. Negative for back pain and gait problem. Neurological: Negative for dizziness, syncope and weakness. Psychiatric/Behavioral: Negative for behavioral problems. The patient is not nervous/anxious. Vitals:    06/24/17 0917   BP: 161/81   Pulse: 84   Resp: 22   Temp: 97.4 °F (36.3 °C)   SpO2: 100%   Weight: 79.8 kg (176 lb)   Height: 6' 2\" (1.88 m)            Physical Exam   Constitutional: He appears well-developed and well-nourished. HENT:   Head: Normocephalic and atraumatic. Mouth/Throat: Oropharynx is clear and moist.   Eyes: EOM are normal. Pupils are equal, round, and reactive to light. Neck: Normal range of motion. Neck supple. Cardiovascular: Normal rate, regular rhythm, normal heart sounds and intact distal pulses. Exam reveals no gallop and no friction rub. No murmur heard. Pulmonary/Chest: Effort normal. No respiratory distress. He has no wheezes. He has no rales. Abdominal: Soft. There is no tenderness. There is no rebound. Musculoskeletal: He exhibits tenderness. Mild pain with ROM of left hip  Tenderness to left pelvis   Neurological: He is alert. No cranial nerve deficit. Motor; symmetric   Skin: No erythema.    Psychiatric: He has a normal mood and affect. His behavior is normal.   Nursing note and vitals reviewed. Note written by Chery Hassan, as dictated by Jeancarlos Mujica MD 9:32 AM      [facility]  ED Course       Procedures  Note: Patient is having moderate to severe pain in his left thigh. Pain is constant but then increases in intensity for less than 30 seconds frequently. Physical exam is unremarkable. He has some increased pain with hip range of motion. Femur x-ray yesterday and CT scan of the head today are unremarkable. There is an apparent decrease Doppler pulse in the left foot. Patient was seen yesterday and sent home. He is 80years old. Fentanyl in the ambulance decreased the pain. Morphine in the ER has not helped. Plan is to give patient some IV Dilaudid, do an arterial Doppler, and talk to the on- for orthopedics. Jeancarlos Mujica MD  11:09 AM    PROGRESS NOTE:  10:57 AM  Updated patient on CT results and discussed plan of care with him and son. CONSULT NOTE:  11:10 AM Jeancarlos Mujica MD spoke with DOYLE Bonilla, Consult for Orthopedics. Discussed available diagnostic tests and clinical findings. He is in agreement with care plans as outlined. Chandrika Stephen will evaluate the patient. PROGRESS NOTE:  11:51 AM  Spoke with Chandrika Stephen regarding the patient. He recommends discharge on steroids with close follow up.    12:42 PM  Patient is feeling better after treatment upon reevaluation. Updated patient on results and discussed plan for further care. Patient verbalizes understanding and agrees to discharge home. Patient was prescribed a medrol dose pack yesterday that he will start today and will continue taking Hydrocodone 5 every 4 hours as needed for pain. Patient understands the importance of follow up and agrees to return to ED if symptoms worsen.

## 2017-06-26 NOTE — SENIOR SERVICES NOTE
Call placed to patient spoke w/ DNL - Mrs.Heishman informed Jorge Arslan was not at home. However could assist. Informed patient had returned back to ER on Saturday. However was doing better after steroids. Inquired about [facility] assessment referral - family has not called provided [facility] [employer] number [phone]. Will communicate w/ CM after contact made w/ HS.

## 2017-12-27 ENCOUNTER — APPOINTMENT (OUTPATIENT)
Dept: GENERAL RADIOLOGY | Age: 82
End: 2017-12-27
Attending: EMERGENCY MEDICINE
Payer: MEDICARE

## 2017-12-27 ENCOUNTER — HOSPITAL ENCOUNTER (OUTPATIENT)
Age: 82
Setting detail: OBSERVATION
Discharge: HOME | End: 2017-12-28
Attending: EMERGENCY MEDICINE | Admitting: INTERNAL MEDICINE
Payer: MEDICARE

## 2017-12-27 DIAGNOSIS — M10.9 GOUTY ARTHRITIS: Primary | ICD-10-CM

## 2017-12-27 DIAGNOSIS — J06.9 ACUTE UPPER RESPIRATORY INFECTION: ICD-10-CM

## 2017-12-27 PROBLEM — N17.9 AKI (ACUTE KIDNEY INJURY) (HCC): Status: ACTIVE | Noted: 2017-12-27

## 2017-12-27 LAB
ALBUMIN SERPL-MCNC: 3.6 G/DL (ref 3.5–5)
ALBUMIN/GLOB SERPL: 0.9 {RATIO} (ref 1.1–2.2)
ALP SERPL-CCNC: 77 U/L (ref 45–117)
ALT SERPL-CCNC: 13 U/L (ref 12–78)
ANION GAP SERPL CALC-SCNC: 10 MMOL/L (ref 5–15)
APPEARANCE UR: CLEAR
AST SERPL-CCNC: 19 U/L (ref 15–37)
BACTERIA URNS QL MICRO: NEGATIVE /HPF
BASOPHILS # BLD: 0.1 K/UL (ref 0–0.1)
BASOPHILS NFR BLD: 1 % (ref 0–1)
BILIRUB SERPL-MCNC: 0.6 MG/DL (ref 0.2–1)
BILIRUB UR QL: NEGATIVE
BUN SERPL-MCNC: 53 MG/DL (ref 6–20)
BUN/CREAT SERPL: 20 (ref 12–20)
CALCIUM SERPL-MCNC: 9.5 MG/DL (ref 8.5–10.1)
CHLORIDE SERPL-SCNC: 97 MMOL/L (ref 97–108)
CO2 SERPL-SCNC: 29 MMOL/L (ref 21–32)
COLOR UR: NORMAL
CREAT SERPL-MCNC: 2.68 MG/DL (ref 0.7–1.3)
DIFFERENTIAL METHOD BLD: ABNORMAL
EOSINOPHIL # BLD: 0.1 K/UL (ref 0–0.4)
EOSINOPHIL NFR BLD: 2 % (ref 0–7)
EPITH CASTS URNS QL MICRO: NORMAL /LPF
ERYTHROCYTE [DISTWIDTH] IN BLOOD BY AUTOMATED COUNT: 15.6 % (ref 11.5–14.5)
FLUAV AG NPH QL IA: NEGATIVE
FLUBV AG NOSE QL IA: NEGATIVE
GLOBULIN SER CALC-MCNC: 4 G/DL (ref 2–4)
GLUCOSE SERPL-MCNC: 149 MG/DL (ref 65–100)
GLUCOSE UR STRIP.AUTO-MCNC: NEGATIVE MG/DL
HCT VFR BLD AUTO: 37.9 % (ref 36.6–50.3)
HGB BLD-MCNC: 12.1 G/DL (ref 12.1–17)
HGB UR QL STRIP: NEGATIVE
HYALINE CASTS URNS QL MICRO: NORMAL /LPF (ref 0–5)
KETONES UR QL STRIP.AUTO: NEGATIVE MG/DL
LEUKOCYTE ESTERASE UR QL STRIP.AUTO: NEGATIVE
LYMPHOCYTES # BLD: 0.7 K/UL (ref 0.8–3.5)
LYMPHOCYTES NFR BLD: 10 % (ref 12–49)
MCH RBC QN AUTO: 28.5 PG (ref 26–34)
MCHC RBC AUTO-ENTMCNC: 31.9 G/DL (ref 30–36.5)
MCV RBC AUTO: 89.4 FL (ref 80–99)
MONOCYTES # BLD: 0.2 K/UL (ref 0–1)
MONOCYTES NFR BLD: 3 % (ref 5–13)
NEUTS SEG # BLD: 5.5 K/UL (ref 1.8–8)
NEUTS SEG NFR BLD: 84 % (ref 32–75)
NITRITE UR QL STRIP.AUTO: NEGATIVE
PH UR STRIP: 6 [PH] (ref 5–8)
PLATELET # BLD AUTO: 207 K/UL (ref 150–400)
POTASSIUM SERPL-SCNC: 3.8 MMOL/L (ref 3.5–5.1)
PROT SERPL-MCNC: 7.6 G/DL (ref 6.4–8.2)
PROT UR STRIP-MCNC: NEGATIVE MG/DL
RBC # BLD AUTO: 4.24 M/UL (ref 4.1–5.7)
RBC #/AREA URNS HPF: NORMAL /HPF (ref 0–5)
RBC MORPH BLD: ABNORMAL
SODIUM SERPL-SCNC: 136 MMOL/L (ref 136–145)
SP GR UR REFRACTOMETRY: 1.01 (ref 1–1.03)
UR CULT HOLD, URHOLD: NORMAL
UROBILINOGEN UR QL STRIP.AUTO: 1 EU/DL (ref 0.2–1)
WBC # BLD AUTO: 6.6 K/UL (ref 4.1–11.1)
WBC URNS QL MICRO: NORMAL /HPF (ref 0–4)

## 2017-12-27 PROCEDURE — 97162 PT EVAL MOD COMPLEX 30 MIN: CPT

## 2017-12-27 PROCEDURE — 85025 COMPLETE CBC W/AUTO DIFF WBC: CPT | Performed by: EMERGENCY MEDICINE

## 2017-12-27 PROCEDURE — 99285 EMERGENCY DEPT VISIT HI MDM: CPT

## 2017-12-27 PROCEDURE — 81001 URINALYSIS AUTO W/SCOPE: CPT | Performed by: EMERGENCY MEDICINE

## 2017-12-27 PROCEDURE — 87804 INFLUENZA ASSAY W/OPTIC: CPT | Performed by: EMERGENCY MEDICINE

## 2017-12-27 PROCEDURE — 36415 COLL VENOUS BLD VENIPUNCTURE: CPT | Performed by: EMERGENCY MEDICINE

## 2017-12-27 PROCEDURE — 99218 HC RM OBSERVATION: CPT

## 2017-12-27 PROCEDURE — 97116 GAIT TRAINING THERAPY: CPT

## 2017-12-27 PROCEDURE — G8978 MOBILITY CURRENT STATUS: HCPCS

## 2017-12-27 PROCEDURE — 71010 XR CHEST PORT: CPT

## 2017-12-27 PROCEDURE — 74011250636 HC RX REV CODE- 250/636: Performed by: INTERNAL MEDICINE

## 2017-12-27 PROCEDURE — 96360 HYDRATION IV INFUSION INIT: CPT

## 2017-12-27 PROCEDURE — G8979 MOBILITY GOAL STATUS: HCPCS

## 2017-12-27 PROCEDURE — 80053 COMPREHEN METABOLIC PANEL: CPT | Performed by: EMERGENCY MEDICINE

## 2017-12-27 PROCEDURE — 96361 HYDRATE IV INFUSION ADD-ON: CPT

## 2017-12-27 PROCEDURE — 74011250637 HC RX REV CODE- 250/637: Performed by: INTERNAL MEDICINE

## 2017-12-27 RX ORDER — AMITRIPTYLINE HYDROCHLORIDE 50 MG/1
25 TABLET, FILM COATED ORAL
Status: DISCONTINUED | OUTPATIENT
Start: 2017-12-27 | End: 2017-12-28 | Stop reason: HOSPADM

## 2017-12-27 RX ORDER — ONDANSETRON 2 MG/ML
4 INJECTION INTRAMUSCULAR; INTRAVENOUS
Status: DISCONTINUED | OUTPATIENT
Start: 2017-12-27 | End: 2017-12-28 | Stop reason: HOSPADM

## 2017-12-27 RX ORDER — HYDROCODONE BITARTRATE AND ACETAMINOPHEN 5; 325 MG/1; MG/1
1 TABLET ORAL
Status: DISCONTINUED | OUTPATIENT
Start: 2017-12-27 | End: 2017-12-28 | Stop reason: HOSPADM

## 2017-12-27 RX ORDER — SODIUM CHLORIDE 0.9 % (FLUSH) 0.9 %
5-10 SYRINGE (ML) INJECTION AS NEEDED
Status: DISCONTINUED | OUTPATIENT
Start: 2017-12-27 | End: 2017-12-28 | Stop reason: HOSPADM

## 2017-12-27 RX ORDER — SODIUM CHLORIDE 9 MG/ML
75 INJECTION, SOLUTION INTRAVENOUS CONTINUOUS
Status: DISCONTINUED | OUTPATIENT
Start: 2017-12-27 | End: 2017-12-28 | Stop reason: HOSPADM

## 2017-12-27 RX ORDER — FINASTERIDE 5 MG/1
5 TABLET, FILM COATED ORAL DAILY
Status: DISCONTINUED | OUTPATIENT
Start: 2017-12-28 | End: 2017-12-28 | Stop reason: HOSPADM

## 2017-12-27 RX ORDER — ASPIRIN 325 MG
325 TABLET ORAL DAILY
Status: CANCELLED | OUTPATIENT
Start: 2017-12-28

## 2017-12-27 RX ORDER — SODIUM CHLORIDE 0.9 % (FLUSH) 0.9 %
5-10 SYRINGE (ML) INJECTION EVERY 8 HOURS
Status: DISCONTINUED | OUTPATIENT
Start: 2017-12-27 | End: 2017-12-28 | Stop reason: HOSPADM

## 2017-12-27 RX ORDER — CARVEDILOL 3.12 MG/1
3.12 TABLET ORAL 2 TIMES DAILY WITH MEALS
Status: DISCONTINUED | OUTPATIENT
Start: 2017-12-27 | End: 2017-12-28 | Stop reason: HOSPADM

## 2017-12-27 RX ORDER — ALBUTEROL SULFATE 0.83 MG/ML
2.5 SOLUTION RESPIRATORY (INHALATION)
Status: DISCONTINUED | OUTPATIENT
Start: 2017-12-27 | End: 2017-12-28 | Stop reason: HOSPADM

## 2017-12-27 RX ADMIN — CARVEDILOL 3.12 MG: 3.12 TABLET, FILM COATED ORAL at 20:28

## 2017-12-27 RX ADMIN — SODIUM CHLORIDE 75 ML/HR: 900 INJECTION, SOLUTION INTRAVENOUS at 20:28

## 2017-12-27 RX ADMIN — Medication 10 ML: at 21:36

## 2017-12-27 RX ADMIN — AMITRIPTYLINE HYDROCHLORIDE 25 MG: 50 TABLET, FILM COATED ORAL at 21:36

## 2017-12-27 NOTE — IP AVS SNAPSHOT
Summary of Care Report The Summary of Care report has been created to help improve care coordination. Users with access to [employer] or [address] (Web-based application) may access additional patient information including the Discharge Summary. If you are not currently a [address] user and need more information, please call the number listed below in the [place] [address] section and ask to be connected with Medical Records. Facility Information Name Address Phone [phone] [phone] Patient Information Patient Name Sex ANSHUL Guardado ([id number]) Male 1920 Discharge Information Admitting Provider Service Area Unit Keron Bourgeois MD / [facility] / [phone] Discharge Provider Discharge Date/Time Discharge Disposition Destination (none) 2017 Midday (Pending) AHR (none) Patient Language Language ENGLISH [13] Hospital Problems as of 2017  Reviewed: 2017  9:02 AM by Janett Brown NP Class Noted - Resolved Last Modified POA Active Problems Stage 4 chronic kidney disease ([facility] [address] (Chronic)  2017 - Present 2017 by Janett Brown NP Yes Entered by Keron Bourgeois MD  
  * (Principal)URI (upper respiratory infection)  2017 - Present 2017 by Janett Brown NP Yes Entered by Janett Brown NP  
  HTN (hypertension) (Chronic)  2017 - Present 2017 by Janett Brown NP Yes Entered by Janett Brown NP Non-Hospital Problems as of 2017  Reviewed: 2017  9:02 AM by Janett Brown NP Class Noted - Resolved Last Modified Active Problems   Weakness generalized  2013 - Present 2017 by Janett Brown NP  
 Entered by Rubi Weinebrg MD  
  
You are allergic to the following Allergen Reactions Pamelor (Nortriptyline) Unknown (comments) Pcn (Penicillins) Unknown (comments) Sulfa (Sulfonamide Antibiotics) Unknown (comments) Current Discharge Medication List  
  
START taking these medications Dose & Instructions Dispensing Information Comments  
 albuterol 90 mcg/actuation inhaler Commonly known as:  PROVENTIL HFA, VENTOLIN HFA, PROAIR HFA Dose:  1 Puff Take 1 Puff by inhalation every four (4) hours as needed for Wheezing. Quantity:  1 Inhaler Refills:  0 CONTINUE these medications which have CHANGED Dose & Instructions Dispensing Information Comments  
 methylPREDNISolone 4 mg tablet Commonly known as:  MEDROL (MOMO) What changed:  additional instructions Directions per dose pack Quantity:  1 Dose Pack Refills:  0 CONTINUE these medications which have NOT CHANGED Dose & Instructions Dispensing Information Comments  
 amitriptyline 25 mg tablet Commonly known as:  ELAVIL Dose:  25 mg Take 25 mg by mouth nightly. Refills:  0  
   
 aspirin 325 mg tablet Commonly known as:  ASPIRIN Dose:  325 mg Take 325 mg by mouth every four (4) hours as needed for Pain. Refills:  0 COREG 3.125 mg tablet Generic drug:  carvedilol Dose:  3.125 mg Take 3.125 mg by mouth two (2) times daily (with meals). Refills:  0  
   
 finasteride 5 mg tablet Commonly known as:  PROSCAR Dose:  5 mg Take 5 mg by mouth daily. Refills:  0 HYDROcodone-acetaminophen 5-325 mg per tablet Commonly known as:  Annabella Pont Dose:  1 Tab Take 1 Tab by mouth every four (4) hours as needed for Pain. Max Daily Amount: 6 Tabs. Indications: Pain Quantity:  20 Tab Refills:  0 Follow-up Information Follow up With Details Comments Contact Info Fracisco Francisco MD   Patient can only remember the practice name and not the physician Discharge Instructions Discharge Instructions PATIENT ID: Maicol Berumen MRN: [id number] YOB: 1920 DATE OF ADMISSION: 12/27/2017 11:23 AM   
DATE OF DISCHARGE: 12/28/2017 PRIMARY CARE PROVIDER: Fracisco Francisco MD  
 
ATTENDING PHYSICIAN: Suly Tinoco MD 
DISCHARGING PROVIDER: Adam Kennedy NP To contact this individual call [phone] and ask the  to page. If unavailable ask to be transferred the Adult Hospitalist Department. DISCHARGE DIAGNOSES *** 
 
CONSULTATIONS: ED CONSULT TO SENIOR SERVICES PHYSICAL THERAPY IP CONSULT TO HOSPITALIST 
 
PROCEDURES/SURGERIES: * No surgery found * PENDING TEST RESULTS:  
At the time of discharge the following test results are still pending: *** FOLLOW UP APPOINTMENTS:  
Follow-up Information Follow up With Details Comments Contact Info Fracisco Francisco MD   Patient can only remember the practice name and not the physician ADDITIONAL CARE RECOMMENDATIONS:  
-Repeat BMP on Monday to check on kidney levels. -Start taking Albuterol inhaler as needed for shortness of breath and Prednisone taper for your upper respiratory infection.  
  
DIET: Regular 
  
ACTIVITY: Activity as tolerated and PT/OT Eval and Treat 
  
WOUND CARE: none 
  
EQUIPMENT needed: none 
  
 
DISCHARGE MEDICATIONS: 
 Albuterol (AccuNeb, Proventil, Proventil HFA, Ventolin HFA) - (By breathing) Why this medicine is used:  
Treats or prevents bronchospasm. Contact a nurse or doctor right away if you have: · Trouble breathing, increased wheezing or cough, chest tightness · Fast, pounding, or uneven heartbeat; chest pain · Muscle cramps, nausea, vomiting · Dry mouth, increased thirst  
 
Common side effects: · Tremors, nervousness · Cough, sore throat, runny or stuffy nose · Headache © 2017 [facility] Information is for End User's use only and may not be sold, redistributed or otherwise used for commercial purposes. Prednisone (predniSONE Intensol, Prednicot, Deltasone, Marry) - (By mouth) Why this medicine is used:  
Treats many diseases and conditions, especially problems related to inflammation. Contact a nurse or doctor right away if you have: 
· Rapid weight gain; swelling in your hands, ankles, or feet · Severe stomach pain, nausea, vomiting, or red or black stools · Depression, unusual thoughts, feelings, or behaviors, trouble sleeping · Fever, chills, cough, sore throat, and body aches · Trouble seeing, eye pain Common side effects: 
· Increased appetite, weight gain · Round, puffy face · Muscle pain, weakness © 2017 [facility] Information is for End User's use only and may not be sold, redistributed or otherwise used for commercial purposes. See Medication Reconciliation Form · It is important that you take the medication exactly as they are prescribed. · Keep your medication in the bottles provided by the pharmacist and keep a list of the medication names, dosages, and times to be taken in your wallet. · Do not take other medications without consulting your doctor. NOTIFY YOUR PHYSICIAN FOR ANY OF THE FOLLOWING:  
Fever over 101 degrees for 24 hours. Chest pain, shortness of breath, fever, chills, nausea, vomiting, diarrhea, change in mentation, falling, weakness, bleeding. Severe pain or pain not relieved by medications. Or, any other signs or symptoms that you may have questions about. DISPOSITION: 
  Home With: 
 OT  PT  HH  RN  
  
 SNF/Inpatient Rehab/LTAC  
X assisted living Hospice Other: PROBLEM LIST Updated: Yes X Signed:  
Manjit Mensah NP 
12/28/2017 
9:12 AM 
 
Chart Review Routing History Recipient Method Report Sent By Elizabeth Julio DPM  
 Phone: [phone] In [facility] [employer], [address] [place] [address] [[id number]] 11/18/2013  9:29 PM 11/18/2013 Clary Willis DPM  
Phone: [phone] [facility] [place] [[id number]] 11/18/2013  9:59 PM 11/18/2013

## 2017-12-27 NOTE — H&P
[address]   HISTORY AND PHYSICAL      German Granados  MR#: [id number]  : 1920  ACCOUNT #: [de-identified]   ADMIT DATE: 2017    HISTORY OF PRESENT ILLNESS:  A 49-year-old white male presents to the Emergency Department from home [facility]) in which he states he is weak, which has been present for the previous 2-3 days. This appears to be associated with an upper respiratory tract infection. It was believed at the [facility] that patient had been exposed to influenza A as multiple family members have it. He was given a prescription for Tamiflu; however, did not get this filled and his weakness continued. Last night, he had neck pain in which he was given Norco, which he normally takes and was causing him to have nausea and one episode of vomiting. He reports a productive cough of yellow sputum. The only other complaint he has is that he has had a decreased appetite for two days. He denies any fever, chills, chest pain, shortness of breath, edema, stuffiness, epistaxis, wheezing, diarrhea, constipation, change of frequency or urgency of urination, tingling, numbness. PAST MEDICAL HISTORY:  1. Gastroesophageal reflux disease. 2.  Gouty arthritis. 3.  Hypercholesterolemia. 4.  Chronic kidney disease stage IV. 5.  Hypertension. 6.  Osteoarthritis. 7.  Benign prostatic hypertrophy. PAST SURGICAL HISTORY:  None. MEDICATIONS:  Amitriptyline 25 mg p.o. at bedtime, aspirin 325 mg p.o. q.  4 hours p.r.n., carvedilol 3.125 mg p.o. b.i.d., finasteride 5 mg p.o. every day, Norco 5/325 mg p.o. q.4 hours p.r.n., recently completed a Medrol Dosepak, Betadine 10% to the foot b.i.d. ALLERGIES:  PENICILLIN, NORTRIPTYLINE, SULFA, ALL OF THESE REACTIONS ARE UNKNOWN. FAMILY HISTORY:  Noncontributory to presentation. SOCIAL HISTORY:  The patient resides at the [facility]. Denies any alcohol, tobacco or illicit drug usage.     REVIEW OF SYSTEMS:  Sixteen systems reviewed and significant for stated in HPI. PHYSICAL EXAMINATION:  GENERAL:  Elderly white male lying in bed in no acute cardiopulmonary distress. VITAL SIGNS:  Temperature 98.4 degrees Fahrenheit, pulse 75, respirations 18, blood pressure 120/48, O2 sat 97% on room air. HEAD:  Normocephalic, atraumatic. No maxillary or frontal tenderness. EYES:  Pupils equal, round, react to light. Anicteric sclerae. Conjunctivae pink, moist.    NOSE:  No deviated or perforated septum, no nasal discharge. MOUTH/THROAT:  Oral mucosa pink, moist, nonerythematous throat. No ulcers or lesions. CVS:  Regular rate and rhythm. S1, S2 audible. No murmurs, gallops, rubs. No edema, no JVD, no carotid abdominal bruits. Dorsalis pedis pulses 2+ bilaterally. LUNGS:  Clear to auscultation bilaterally. No wheeze, rales or rhonchi. ABDOMEN:  Bowel sounds present, soft, nontender, nondistended. No guarding, rebound, or hepatosplenomegaly. MUSCULOSKELETAL:  2+ range of motion of the upper and lower extremities  bilaterally. No gross muscle atrophy noted. NEUROLOGIC:  Alert and oriented to person, place, time. Cranial nerves II through XII grossly intact. Bicep and patellar deep tendon reflexes 2+ bilaterally. Muscle strength 5/5 in all extremities. SKIN:  No rashes or pallor. ENDOCRINE:  No thyromegaly. LYMPHATIC:  No cervical, axillary or inguinal lymphadenopathy. LABORATORY DATA:    CBC with differential:  WBC 6.6, hemoglobin 12.1, hematocrit 37.9, platelets 957, neutrophils 84%, lymphocytes, 10% monocytes 3%, eosinophils 2%, basophils 1%. CMP:  Sodium 136, potassium 3.8, chloride 97, bicarb 29, anion gap 10, BUN 53, creatinine 2.68, glucose 149, calcium 9.5, total bilirubin 0.6, total protein 7.6, albumin 3.6, ALT 13, AST 19, alkaline phosphatase 77.       Urinalysis:  Color yellow/straw, appearance clear, specific gravity 1.014, pH 6, protein negative, glucose negative, ketones negative, blood negative, bilirubin negative, urobilinogen 1, nitrites negative, leukocyte esterase negative, epithelial cells few, WBCs 0-4, RBCs 0-5, bacteria negative, hyaline cast 2-5. Chest x-ray shows no acute process per radiology. ASSESSMENT AND  PLAN:  A 80-year-old white male with acute kidney injury and upper respiratory infection. PROBLEM LIST:  1. Acute kidney injury. 2.  An upper respiratory tract infection. 3.  Gastroesophageal reflux disease. 4.  Hypertension. 5.  Gouty arthritis. 6.  Hypercholesterolemia. 7.  Chronic kidney disease stage IV. 8.  Benign prostatic hypertrophy. PLAN:  The patient will be placed on observation. I will place normal saline at 75 mL per hour given his creatinine is increased to 2.68, which qualifies him for acute kidney injury. We will recheck BMP in the a.m. As it relates to his upper respiratory tract infection will do supportive therapy at this time. I will hold off placing antibiotics as it is most likely viral.  We will place albuterol p.r.n. The patient will be a placement issue as in the Emergency Department, case management was consulted to try to place the patient and was unsuccessful. He did have PT and OT consult in the Emergency Department. Their recommendation is that he will likely need either a skilled nursing facility or acute rehab and he was not deemed safe to return back to [facility]. We will continue PT/OT while here until placement. We will resume his outpatient medications for his comorbidities. For his hypertension, we will continue carvedilol. We will continue finasteride for his benign prostatic hypertrophy. We will hold off giving the aspirin 325 mg q.4 hours p.r.n., which he has received as outpatient for pain. We will continue his Norco 5/325 mg p.o. q.6 hours p.r.n. CODE STATUS:  Full code. We will place SCD hose for DVT prophylaxis. TOTAL TIME FOR ADMISSION:  75 minutes.       Antoinette Gutierrez       OB/BILLY  D: 12/27/2017 17:04     T: 12/27/2017 18:33  JOB #: [id number]

## 2017-12-27 NOTE — IP AVS SNAPSHOT
[address] [facility] [address] [place] [address] 
[phone] Patient: Wen Cueto MRN: [id number] AIP:2/62/1677 My Medications TAKE these medications as instructed Instructions Each Dose to Equal  
 Morning Noon Evening Bedtime  
 albuterol 90 mcg/actuation inhaler Commonly known as:  PROVENTIL HFA, VENTOLIN HFA, PROAIR HFA Your last dose was: Your next dose is: Take 1 Puff by inhalation every four (4) hours as needed for Wheezing. 1 Puff  
    
   
   
   
  
 amitriptyline 25 mg tablet Commonly known as:  ELAVIL Your last dose was: Your next dose is: Take 25 mg by mouth nightly. 25 mg  
    
   
   
   
  
 aspirin 325 mg tablet Commonly known as:  ASPIRIN Your last dose was: Your next dose is: Take 325 mg by mouth every four (4) hours as needed for Pain. 325 mg COREG 3.125 mg tablet Generic drug:  carvedilol Your last dose was: Your next dose is: Take 3.125 mg by mouth two (2) times daily (with meals). 3.125 mg  
    
   
   
   
  
 finasteride 5 mg tablet Commonly known as:  PROSCAR Your last dose was: Your next dose is: Take 5 mg by mouth daily. 5 mg HYDROcodone-acetaminophen 5-325 mg per tablet Commonly known as:  Jesi Helms Your last dose was: Your next dose is: Take 1 Tab by mouth every four (4) hours as needed for Pain. Max Daily Amount: 6 Tabs. Indications: Pain 1 Tab  
    
   
   
   
  
 methylPREDNISolone 4 mg tablet Commonly known as:  MEDROL (MOMO) Your last dose was: Your next dose is:    
   
   
 Directions per dose pack Where to Get Your Medications Information on where to get these meds will be given to you by the nurse or doctor. ! Ask your nurse or doctor about these medications  
  albuterol 90 mcg/actuation inhaler HYDROcodone-acetaminophen 5-325 mg per tablet  
 methylPREDNISolone 4 mg tablet

## 2017-12-27 NOTE — PROGRESS NOTES
Care Management-Received consult from PT that patient is from [place] [address] at [facility] and will need either assisted living or 24 hour care in his apartment or from family. Patient to ED for feeling poorly, nausea, body aches, productive cough, yellow flem. He was with family members (son and daughter-in-law) over [place] who have recent diagnosis of flu. They just took him back to his apartment yesterday. Patient was prescribed Tamiflu yesterday and was to fill it this morning when pharmacy opened. Patient unable to ambulate this morning. PCP: Rodrigo Sanders NP ([phone])     Insurance: Medicare; [employer]    Met with patient in the room. Then later spoke with son Bárbara Jacob ([phone]; [phone]) and his wife on the phone. Patient's address on the face sheet is his house address. He currently lives at [address], [address] [place] [address]. Patient said that he normally uses a walker to ambulate and is independent with his ADLs. He usually fixes his own meals and will sometimes eat in the dining henry. He does his own bathing and dressing. He was staying at his son's house over [place]. His son just brought him home yesterday. Both son and wife were diagnosed with the flu. Patient saw NP yesterday and was given a prescription of Tamiflu to use profalacticly. He was to get the script filled this morning when pharmacy opened. Patient said he was unable to walk since yesterday. He also has not eaten since breakfast yesterday, because he could not get up to get the food. Per son, they still have the flu and cannot  the patient or assist him. Spoke with Marilyn Rosa, contact at [facility]. They do not have a respite bed available today in assisted living. They may have one available tomorrow or can create one by tomorrow. She also checked with [facility] which provides CNAs on site.  They are already short-staffed and will not be able to service patient tonight. Son and patient are in agreement for patient to go to respite in assisted living. Faxed PT note and chest xray to Ms. Roberto Carlos Chanelle. She said the NP will be back tomorrow and will complete the assisted living paperwork. The family will need to speak with Patito Bryant about pricing. He will be back tomorrow as well. This CM asked if they can transport patient via their wheelchair van. She said they can if she knows ahead of time. Plan: Respite in assisted living at [facility] on 12-28-17. To Do:   Ayad Johnson will work on creating a respite room for patient to go to tomorrow, including paperwork to be completed by patient's NP.   2-Family will need to talk to facility regarding pricing. 3-CM will need to assist with coordinating transportation for discharge to assisted living and obtaining orders for therapy at assisted living. 4-MD will need to write order for any therapy that they would like patient to have at discharge. Care Management Interventions  PCP Verified by CM:  Yes (Yanelis Holm NP ([phone]))  Last Visit to PCP: 12/26/17  Transition of Care Consult (CM Consult): Assisted Living ([address] [place] [address])  Discharge Durable Medical Equipment: No  Physical Therapy Consult: Yes  Occupational Therapy Consult: No  Speech Therapy Consult: No  Current Support Network: Lives Alone (Independent Living at [address])  Confirm Follow Up Transport: Family  Plan discussed with Pt/Family/Caregiver: Yes  Freedom of Choice Offered: Yes  Discharge Location  Discharge Placement: Assisted Living ([facility])    SOOL Spears

## 2017-12-27 NOTE — ED NOTES
TRANSFER - OUT REPORT:    Verbal report given to Lilia Rushing RN(name) on Jorge Thurston  being transferred to [facility](unit) for routine progression of care       Report consisted of patients Situation, Background, Assessment and   Recommendations(SBAR). Information from the following report(s) SBAR, ED Summary, Procedure Summary, MAR and Recent Results was reviewed with the receiving nurse. Lines:   Peripheral IV 12/27/17 Left Antecubital (Active)   Site Assessment Clean, dry, & intact 12/27/2017 11:54 AM   Phlebitis Assessment 0 12/27/2017 11:54 AM   Infiltration Assessment 0 12/27/2017 11:54 AM   Dressing Status Clean, dry, & intact 12/27/2017 11:54 AM   Hub Color/Line Status Pink 12/27/2017 11:54 AM        Opportunity for questions and clarification was provided.       Patient transported with:   Thanx

## 2017-12-27 NOTE — ED NOTES
CONSULT NOTE:  3:53 PM Sharee Miramontes MD  spoke with Dr. John Ba, Consult for Hospitalist.  Discussed available diagnostic tests and clinical findings.   He is in agreement with care plans as outlined and will admit the Pt

## 2017-12-27 NOTE — ED TRIAGE NOTES
Pt. arrives via EMS from assisted living at [facility] building. Complains of feeling poorly, nausea, body aches, productive cough yellow flem. States being exposed to family members over [place] also diagnosed flu. Per EMS pt has Rx for Tamiflu but has not filled it.

## 2017-12-27 NOTE — PROGRESS NOTES
physical Therapy Emergency Department EVALUATION  Patient: Shabbir Lizarraga (71 y.o. male)  Date: 12/27/2017  Primary Diagnosis: There are no admission diagnoses documented for this encounter. Precautions:   Fall  ASSESSMENT :  Based on the objective data described below, the patient is a pleasant 80year old male with complaints of aches, pains, dehydration, back pain, and feeling weak. He was exposed to the flu with negative flu swabs. Nursing was unable to safely stand the patient to attempt to urinate and a PT consult was placed for assessment for safe discharge. Patient normally lives at [facility]. He reports that he has lived there for 3 months following a fall backward onto the concrete. He was recently discharged from [facility] and was doing quite well with a rollator in his apartment until yesterday when he bacame too weak to dress himself or to walk. During the PT Evaluation in the ED, the patient was able to logroll to the side of the stretcher with supervision but required moderate assist on 2 attempts to perform sit>stand from an elevated stretcher. He ambulated 15 feet with the rolling walker but was too fatigued and and weak to continue ambulation. He scored a 9/28 on the Tinetti Scale which classifies him as a high fall risk. He reports that he has not eaten or had anything to drink in 2 days which has affected his energy. Nurse is waiting on lunch to be delivered to the patient. Discussed with Case Management the need for patient to have 24 hour assistance or assisted living if he is discharged today due to patient being a high fall risk. Admission for this patient is recommended with continued acute therapy or home with 24 hour assistance to assist with ADL's and safe mobility. PLAN :  Frequency/Duration: Pending admission, the patient will be followed by physical therapy 5 times a week to address goals.     If admitted, Recommendations and Planned Interventions:  [x]           Bed Mobility Training             []    Neuromuscular Re-Education  [x]           Transfer Training                   []    Orthotic/Prosthetic Training  [x]           Gait Training                         []    Modalities  [x]           Therapeutic Exercises           []    Edema Management/Control  [x]           Therapeutic Activities            [x]    Patient and Family Training/Education  []           Other (comment):      Discharge Recommendations:     []   Home with family  []   Skilled nursing facility  [x]   Admission to hospital with rehab likely needed  [x]   Inpatient rehab referral  []   Outpatient physical therapy referral  [x]   Other: Home with 24 hour assistance and HHPT    Further Equipment Recommendations for Discharge:   []   Rolling walker with 5\" wheels  []   Crutches   []   [employer]   []   Wheelchair   []   Other:     COMMUNICATION/EDUCATION:   Communication/Collaboration:  [x]   Fall prevention education was provided and the patient/caregiver indicated understanding. []   Patient/family have participated as able and agree with findings and recommendations. []   Patient is unable to participate in plan of care at this time. Findings and recommendations were discussed with:  Nurse and Case Management       SUBJECTIVE:   Patient stated I am so dehydrated. I have not eaten in 2 days. I am weak.     OBJECTIVE DATA SUMMARY:   HISTORY:    Past Medical History:   Diagnosis Date    Actinic keratosis     Allergic rhinitis     Anemia     Arthritis     C-spine degeneration    Benign prostatic hypertrophy     Bundle branch block     Cervical disc disorder     Chronic kidney disease     \"renal disorder\"    Cough     GERD (gastroesophageal reflux disease)     Gout     Hypercholesterolemia     Hypertension     Kidney disease     Osteoarthritis     Syncope    History reviewed. No pertinent surgical history.   Prior Level of Function/Home Situation: Patient reports that he lives alone in an apartment at [facility] He was recently discharged from [address] and was modified independent with a rollator. He has not fallen in 3 months. He cooks his own meals and takes a shower without assistance per patient. He does not drive. He has family members that will call to check on him throughout the week. Personal factors and/or comorbidities impacting plan of care:     Home Situation  Home Environment: Independent living ([address] )  # Steps to Enter: 0  One/Two Story Residence: One story  Support Systems: Child(ruby) (Recently discharged from [address] services at [facility] )  Patient Expects to be Discharged to[de-identified] Assisted living (vs Independent Living with caregivers.)  Current DME Used/Available at Home: [employer], straight, Walker, rollator, Raised toilet seat  Tub or Shower Type: Shower    EXAMINATION/PRESENTATION/DECISION MAKING:   Critical Behavior:  Neurologic State: Alert  Orientation Level: Oriented X4  Cognition: Follows commands  Safety/Judgement: Awareness of environment, Insight into deficits  Hearing: Auditory  Auditory Impairment: Hard of hearing, bilateral, Hearing aid(s), Hard of hearing, right side  Skin:  Grossly intact. Frail. Range Of Motion:  AROM: Generally decreased, functional (Able to elevate all extremities against gravity)  PROM: Generally decreased, functional  Strength:    Strength: Generally decreased, functional (grossly 4/5)  Tone & Sensation:   Tone: Normal  Sensation: Intact    Coordination:  Coordination: Generally decreased, functional    Functional Mobility:  Bed Mobility:  Rolling: Supervision  Supine to Sit: Contact guard assistance  Sit to Supine: Contact guard assistance  Scooting: Supervision  Transfers:  Sit to Stand: Moderate assistance (practiced sit>stand transfers and required moderate assistance each time. )  Stand to Sit: Contact guard assistance  Balance:   Sitting: Intact  Standing: Impaired; With support  Standing - Static: Fair  Standing - Dynamic : Fair (Difficulty with turning to sit down and turning in the henry)  Ambulation/Gait Training:  Distance (ft): 15 Feet (ft)  Assistive Device: Gait belt;Walker, rolling  Ambulation - Level of Assistance: Contact guard initially and then minimal assistance as patient fatigued. Gait Abnormalities: Decreased step clearance;Shuffling gait (forward head and shoulders)  Base of Support: Narrowed  Speed/Johanna: Slow  Step Length: Right shortened;Left shortened   Gait limited due to impaired endurance and debility. Patient fearful of falling with slight posterior lean. Special Tests:  Tinetti test:    Sitting Balance: 1  Arises: 0  Attempts to Rise: 0  Immediate Standing Balance: 0  Standing Balance: 0  Nudged: 1  Eyes Closed: 0  Turn 360 Degrees - Continuous/Discontinuous: 0  Turn 360 Degrees - Steady/Unsteady: 0  Sitting Down: 1  Balance Score: 3  Indication of Gait: 0  R Step Length/Height: 1  L Step Length/Height: 1  R Foot Clearance: 1  L Foot Clearance: 1  Step Symmetry: 1  Step Continuity: 0  Path: 1  Trunk: 0  Walking Time: 0  Gait Score: 6  Total Score: 9       Tinetti Test and G-code impairment scale:  Percentage of Impairment CH    0%   CI    1-19% CJ    20-39% CK    40-59% CL    60-79% CM    80-99% CN     100%   Tinetti  Score 0-28 28 23-27 17-22 12-16 6-11 1-5 0       Tinetti Tool Score Risk of Falls  <19 = High Fall Risk  19-24 = Moderate Fall Risk  25-28 = Low Fall Risk  Tinetti ME. Performance-Oriented Assessment of Mobility Problems in Elderly Patients. [place] 66; [id number].  (Scoring Description: PT Bulletin Feb. 10, 1993)    Older adults: Bart Purvis et al, 2009; n = 1000 [facility] elderly evaluated with ABC, DENITA, ADL, and IADL)  · Mean DENITA score for males aged 69-68 years = 26.21(3.40)  · Mean DENITA score for females age 69-68 years = 25.16(4.30)  · Mean DENITA score for males over 80 years = 23.29(6.02)  · Mean DENITA score for females over 80 years = 17.20(8.32)        In compliance with CMSs Claims Based Outcome Reporting, the following G-code set was chosen for this patient based on their primary functional limitation being treated: The outcome measure chosen to determine the severity of the functional limitation was the Tinetti with a score of 9/28 which was correlated with the impairment scale. ? Mobility - Walking and Moving Around:     - CURRENT STATUS: CL - 60%-79% impaired, limited or restricted    - GOAL STATUS: CI - 1%-19% impaired, limited or restricted    - D/C STATUS:  ---------------To be determined---------------     Physical Therapy Evaluation Charge Determination   History Examination Presentation Decision-Making   MEDIUM  Complexity : 1-2 comorbidities / personal factors will impact the outcome/ POC  MEDIUM Complexity : 3 Standardized tests and measures addressing body structure, function, activity limitation and / or participation in recreation  MEDIUM Complexity : Evolving with changing characteristics  MEDIUM Complexity : FOTO score of 26-74      Based on the above components, the patient evaluation is determined to be of the following complexity level: MEDIUM     Pain:  Pain Scale 1: Numeric (0 - 10)  Pain Intensity 1: 0  Pain Location 1: Generalized  Activity Tolerance:   /52 pre gait, 136/70 post gait, heart rate 76 post gait, O2 sats stable at 97% on room air.      After treatment:   []   Patient left in no apparent distress sitting up in chair  [x]   Patient left in no apparent distress in bed  [x]   Call bell left within reach  [x]   Nursing notified  []   Caregiver present  []   Bed alarm activated        Thank you for this referral.  Fausto Olmedo, PT  Time Calculation: 25 mins

## 2017-12-27 NOTE — ED PROVIDER NOTES
HPI Comments: 80 y.o. male with past medical history significant for anemia, CKD, bundle branch block, HTN, cervical disc disorder, and gout who presents from [facility] via EMS with chief complaint of weakness. Pt reports generalized weakness, mild diarrhea, mild nonproductive cough, increased back pain, and increased neck pain after he was in contact with relatives ill with flu. He states his sx began last night and worsened this morning. Pt was given a full pain pill last night but he vomited shortly thereafter. He notes his current neck and back pain are in the same area of historical neck and back pain. Pt denies fever and dysuria. There are no other acute medical concerns at this time. Note written by Chery Connell, as dictated by Liliya Avalos MD 11:41 AM    The history is provided by the patient. Past Medical History:   Diagnosis Date    Actinic keratosis     Allergic rhinitis     Anemia     Arthritis     C-spine degeneration    Benign prostatic hypertrophy     Bundle branch block     Cervical disc disorder     Chronic kidney disease     \"renal disorder\"    Cough     GERD (gastroesophageal reflux disease)     Gout     Hypercholesterolemia     Hypertension     Kidney disease     Osteoarthritis     Syncope        History reviewed. No pertinent surgical history. History reviewed. No pertinent family history. Social History     Social History    Marital status:      Spouse name: N/A    Number of children: N/A    Years of education: N/A     Occupational History    Not on file.      Social History Main Topics    Smoking status: Never Smoker    Smokeless tobacco: Not on file    Alcohol use No    Drug use: No    Sexual activity: Not on file     Other Topics Concern    Not on file     Social History Narrative         ALLERGIES: Pamelor [nortriptyline]; Pcn [penicillins]; and Sulfa (sulfonamide antibiotics)    Review of Systems   Constitutional: Negative for activity change, chills and fever. HENT: Negative for nosebleeds, sore throat, trouble swallowing and voice change. Eyes: Negative for visual disturbance. Respiratory: Positive for cough. Negative for shortness of breath. Cardiovascular: Negative for chest pain and palpitations. Gastrointestinal: Positive for diarrhea and vomiting. Negative for abdominal pain, constipation and nausea. Genitourinary: Negative for difficulty urinating, dysuria, hematuria and urgency. Musculoskeletal: Positive for back pain and neck pain. Negative for neck stiffness. Skin: Negative for color change. Allergic/Immunologic: Negative for immunocompromised state. Neurological: Positive for weakness. Negative for dizziness, seizures, syncope, light-headedness, numbness and headaches. Psychiatric/Behavioral: Negative for behavioral problems, confusion, hallucinations, self-injury and suicidal ideas. All other systems reviewed and are negative. Vitals:    12/27/17 1129   BP: 133/69   Pulse: 75   Resp: 18   Temp: 98.4 °F (36.9 °C)   SpO2: 94%   Weight: 84.4 kg (186 lb)   Height: 6' 2\" (1.88 m)            Physical Exam   Constitutional: He is oriented to person, place, and time. No distress. Elderly   HENT:   Head: Normocephalic and atraumatic. Eyes: Pupils are equal, round, and reactive to light. Neck: Normal range of motion. Neck supple. Cardiovascular: Normal rate, regular rhythm and normal heart sounds. Exam reveals no gallop and no friction rub. No murmur heard. Pulmonary/Chest: Effort normal and breath sounds normal. No respiratory distress. He has no wheezes. Abdominal: Soft. Bowel sounds are normal. He exhibits no distension. There is no tenderness. There is no rebound and no guarding. Musculoskeletal: Normal range of motion. Neurological: He is alert and oriented to person, place, and time. Skin: Skin is warm. No rash noted. He is not diaphoretic.    Psychiatric: He has a normal mood and affect. His behavior is normal. Judgment and thought content normal.   Nursing note and vitals reviewed. Note written by Chery Ramirez, as dictated by Fer Gonzalez MD 11:41 AM    [facility]  ED Course   This is a 42-year-old male with past history, review of systems, physical exam as above, presenting with complaints of exposure to the flu. Patient endorses neck pain back pain, which he states is chronic, one episode of emesis last night, mild nonproductive cough, denies fevers, chills, dysuria, chest pain or shortness of breath. Physical exam is remarkable for well-appearing 80year-old male in no acute distress, without fever, tachycardia is clear breath sounds, soft nontender abdomen. Suspect is overly concerned with the exposure to the flu without any symptoms. We'll screen the flu, obtain CMP, CBC and chest x-ray for complaints of weakness and make a disposition based the patient's diagnostics and response to therapy. Procedures      Change of shift. Care of patient signed over to Dr. Hazel Dotson. Handoff complete.   Note written by Chery Ramirez, as dictated by Fer Gonzalez MD 1:51 PM

## 2017-12-27 NOTE — IP AVS SNAPSHOT
[address] [facility] [address] [place] [address] 
[phone] Patient: Florin Antonio MRN: [id number] XH About your hospitalization You were admitted on:  2017 You last received care in the:  [facility] [phone] You were discharged on:  2017 Why you were hospitalized Your primary diagnosis was:  Bailey Fan (Upper Respiratory Infection) Your diagnoses also included:  Stage 4 Chronic Kidney Disease (Hcc), Htn (Hypertension) Things You Need To Do (next 8 weeks) Follow up with Fracisco Francisco MD  
  
Where:  Patient can only remember the practice name and not the physician Discharge Orders None A check arabella indicates which time of day the medication should be taken. My Medications TAKE these medications as instructed Instructions Each Dose to Equal  
 Morning Noon Evening Bedtime  
 albuterol 90 mcg/actuation inhaler Commonly known as:  PROVENTIL HFA, VENTOLIN HFA, PROAIR HFA Your last dose was: Your next dose is: Take 1 Puff by inhalation every four (4) hours as needed for Wheezing. 1 Puff  
    
   
   
   
  
 amitriptyline 25 mg tablet Commonly known as:  ELAVIL Your last dose was: Your next dose is: Take 25 mg by mouth nightly. 25 mg  
    
   
   
   
  
 aspirin 325 mg tablet Commonly known as:  ASPIRIN Your last dose was: Your next dose is: Take 325 mg by mouth every four (4) hours as needed for Pain. 325 mg COREG 3.125 mg tablet Generic drug:  carvedilol Your last dose was: Your next dose is: Take 3.125 mg by mouth two (2) times daily (with meals). 3.125 mg  
    
   
   
   
  
 finasteride 5 mg tablet Commonly known as:  PROSCAR Your last dose was: Your next dose is: Take 5 mg by mouth daily. 5 mg HYDROcodone-acetaminophen 5-325 mg per tablet Commonly known as:  [address] Your last dose was: Your next dose is: Take 1 Tab by mouth every four (4) hours as needed for Pain. Max Daily Amount: 6 Tabs. Indications: Pain 1 Tab  
    
   
   
   
  
 methylPREDNISolone 4 mg tablet Commonly known as:  MEDROL (MOMO) Your last dose was: Your next dose is:    
   
   
 Directions per dose pack Where to Get Your Medications Information on where to get these meds will be given to you by the nurse or doctor. ! Ask your nurse or doctor about these medications  
  albuterol 90 mcg/actuation inhaler HYDROcodone-acetaminophen 5-325 mg per tablet  
 methylPREDNISolone 4 mg tablet Discharge Instructions Discharge Instructions PATIENT ID: Kassandra Ventura MRN: [id number] YOB: 1920 DATE OF ADMISSION: 12/27/2017 11:23 AM   
DATE OF DISCHARGE: 12/28/2017 PRIMARY CARE PROVIDER: Fracisco Francisco MD  
 
ATTENDING PHYSICIAN: Sidney Egan MD 
DISCHARGING PROVIDER: Emely Membreno NP To contact this individual call [phone] and ask the  to page. If unavailable ask to be transferred the Adult Hospitalist Department. DISCHARGE DIAGNOSES *** 
 
CONSULTATIONS: ED CONSULT TO SENIOR SERVICES PHYSICAL THERAPY IP CONSULT TO HOSPITALIST 
 
PROCEDURES/SURGERIES: * No surgery found * PENDING TEST RESULTS:  
At the time of discharge the following test results are still pending: *** FOLLOW UP APPOINTMENTS:  
Follow-up Information Follow up With Details Comments Contact Info Fracisco Francisco MD   Patient can only remember the practice name and not the physician ADDITIONAL CARE RECOMMENDATIONS:  
-Repeat BMP on Monday to check on kidney levels.   
-Start taking Albuterol inhaler as needed for shortness of breath and Prednisone taper for your upper respiratory infection.  
  
DIET: Regular 
  
ACTIVITY: Activity as tolerated and PT/OT Eval and Treat 
  
WOUND CARE: none 
  
EQUIPMENT needed: none 
  
 
DISCHARGE MEDICATIONS: 
 Albuterol (AccuNeb, Proventil, Proventil HFA, Ventolin HFA) - (By breathing) Why this medicine is used:  
Treats or prevents bronchospasm. Contact a nurse or doctor right away if you have: · Trouble breathing, increased wheezing or cough, chest tightness · Fast, pounding, or uneven heartbeat; chest pain · Muscle cramps, nausea, vomiting · Dry mouth, increased thirst  
 
Common side effects: · Tremors, nervousness · Cough, sore throat, runny or stuffy nose · Headache © 2017 [address] Information is for End User's use only and may not be sold, redistributed or otherwise used for commercial purposes. Prednisone (predniSONE Intensol, Prednicot, Deltasone, Marry) - (By mouth) Why this medicine is used:  
Treats many diseases and conditions, especially problems related to inflammation. Contact a nurse or doctor right away if you have: 
· Rapid weight gain; swelling in your hands, ankles, or feet · Severe stomach pain, nausea, vomiting, or red or black stools · Depression, unusual thoughts, feelings, or behaviors, trouble sleeping · Fever, chills, cough, sore throat, and body aches · Trouble seeing, eye pain Common side effects: 
· Increased appetite, weight gain · Round, puffy face · Muscle pain, weakness © 2017 [address] Information is for End User's use only and may not be sold, redistributed or otherwise used for commercial purposes. See Medication Reconciliation Form · It is important that you take the medication exactly as they are prescribed. · Keep your medication in the bottles provided by the pharmacist and keep a list of the medication names, dosages, and times to be taken in your wallet. · Do not take other medications without consulting your doctor. NOTIFY YOUR PHYSICIAN FOR ANY OF THE FOLLOWING:  
Fever over 101 degrees for 24 hours. Chest pain, shortness of breath, fever, chills, nausea, vomiting, diarrhea, change in mentation, falling, weakness, bleeding. Severe pain or pain not relieved by medications. Or, any other signs or symptoms that you may have questions about. DISPOSITION: 
  Home With: 
 OT  PT  HH  RN  
  
 SNF/Inpatient Rehab/LTAC  
X assisted living Hospice Other: PROBLEM LIST Updated: Yes X Signed:  
Josselin Flood NP 
12/28/2017 
9:12 AM 
 
  
  
  
Verisim Announcement We are excited to announce that we are making your provider's discharge notes available to you in Verisim. You will see these notes when they are completed and signed by the physician that discharged you from your recent hospital stay. If you have any questions or concerns about any information you see in Verisim, please call the Health Information Department where you were seen or reach out to your Primary Care Provider for more information about your plan of care. Introducing \[facility]\"" & HEALTH SERVICES! Dionna Azul introduces Verisim patient portal. Now you can access parts of your medical record, email your doctor's office, and request medication refills online. 1. In your internet browser, go to [email or web address] 2. Click on the First Time User? Click Here link in the Sign In box. You will see the New Member Sign Up page. 3. Enter your Verisim Access Code exactly as it appears below. You will not need to use this code after youve completed the sign-up process. If you do not sign up before the expiration date, you must request a new code. · Verisim Access Code: [email or web address] Expires: 3/27/2018  4:34 PM 
 
4.  Enter the last four digits of your Social Security Number (xxxx) and Date of Birth (mm/dd/yyyy) as indicated and click Submit. You will be taken to the next sign-up page. 5. Create a WittyParrot ID. This will be your WittyParrot login ID and cannot be changed, so think of one that is secure and easy to remember. 6. Create a WittyParrot password. You can change your password at any time. 7. Enter your Password Reset Question and Answer. This can be used at a later time if you forget your password. 8. Enter your e-mail address. You will receive e-mail notification when new information is available in [address] 9. Click Sign Up. You can now view and download portions of your medical record. 10. Click the Download Summary menu link to download a portable copy of your medical information. If you have questions, please visit the Frequently Asked Questions section of the WittyParrot website. Remember, WittyParrot is NOT to be used for urgent needs. For medical emergencies, dial 911. Now available from your iPhone and Android! Providers Seen During Your Hospitalization Provider Specialty Primary office phone Cyrus Lorenzo MD Emergency Medicine [phone] Tamera Suero MD Internal Medicine [phone] Jennie Diaz MD Hospitalist [phone] Your Primary Care Physician (PCP) Primary Care Physician Office Phone Office Fax OTHER, PHYS ** None ** ** None ** You are allergic to the following Allergen Reactions Pamelor (Nortriptyline) Unknown (comments) Pcn (Penicillins) Unknown (comments) Sulfa (Sulfonamide Antibiotics) Unknown (comments) Recent Documentation Height Weight BMI Smoking Status 1.88 m 84.4 kg 23.88 kg/m2 Never Smoker Emergency Contacts Name Discharge Info Relation Home Work Mobile [employer] DISCHARGE CAREGIVER [3] Child [2] [phone] Patient Belongings The following personal items are in your possession at time of discharge: Dental Appliances: None  Visual Aid: Glasses, With patient      Home Medications: None   Jewelry: Watch  Clothing: Undergarments, Shirt, Socks, Footwear, Pants, Jacket/Coat    Other Valuables: Keys, Eyeglasses, Wallet  Personal Items Sent to Safe: none Please provide this summary of care documentation to your next provider. Signatures-by signing, you are acknowledging that this After Visit Summary has been reviewed with you and you have received a copy. Patient Signature:  ____________________________________________________________ Date:  ____________________________________________________________  
  
[facility] Provider Signature:  ____________________________________________________________ Date:  ____________________________________________________________

## 2017-12-28 VITALS
BODY MASS INDEX: 23.87 KG/M2 | DIASTOLIC BLOOD PRESSURE: 54 MMHG | WEIGHT: 186 LBS | TEMPERATURE: 98.1 F | RESPIRATION RATE: 18 BRPM | HEIGHT: 74 IN | OXYGEN SATURATION: 97 % | SYSTOLIC BLOOD PRESSURE: 121 MMHG | HEART RATE: 64 BPM

## 2017-12-28 PROBLEM — I10 HTN (HYPERTENSION): Chronic | Status: ACTIVE | Noted: 2017-12-28

## 2017-12-28 PROBLEM — N18.4 STAGE 4 CHRONIC KIDNEY DISEASE (HCC): Chronic | Status: ACTIVE | Noted: 2017-12-27

## 2017-12-28 PROBLEM — J06.9 URI (UPPER RESPIRATORY INFECTION): Status: ACTIVE | Noted: 2017-12-28

## 2017-12-28 LAB
ANION GAP SERPL CALC-SCNC: 9 MMOL/L (ref 5–15)
BUN SERPL-MCNC: 53 MG/DL (ref 6–20)
BUN/CREAT SERPL: 21 (ref 12–20)
CALCIUM SERPL-MCNC: 8.4 MG/DL (ref 8.5–10.1)
CHLORIDE SERPL-SCNC: 99 MMOL/L (ref 97–108)
CO2 SERPL-SCNC: 30 MMOL/L (ref 21–32)
CREAT SERPL-MCNC: 2.49 MG/DL (ref 0.7–1.3)
GLUCOSE SERPL-MCNC: 109 MG/DL (ref 65–100)
MAGNESIUM SERPL-MCNC: 1.6 MG/DL (ref 1.6–2.4)
POTASSIUM SERPL-SCNC: 3.9 MMOL/L (ref 3.5–5.1)
SODIUM SERPL-SCNC: 138 MMOL/L (ref 136–145)

## 2017-12-28 PROCEDURE — 97165 OT EVAL LOW COMPLEX 30 MIN: CPT

## 2017-12-28 PROCEDURE — 99218 HC RM OBSERVATION: CPT

## 2017-12-28 PROCEDURE — 36415 COLL VENOUS BLD VENIPUNCTURE: CPT | Performed by: INTERNAL MEDICINE

## 2017-12-28 PROCEDURE — 97116 GAIT TRAINING THERAPY: CPT | Performed by: PHYSICAL THERAPIST

## 2017-12-28 PROCEDURE — 80048 BASIC METABOLIC PNL TOTAL CA: CPT | Performed by: INTERNAL MEDICINE

## 2017-12-28 PROCEDURE — 77030032490 HC SLV COMPR SCD KNE COVD -B

## 2017-12-28 PROCEDURE — 96361 HYDRATE IV INFUSION ADD-ON: CPT

## 2017-12-28 PROCEDURE — G8988 SELF CARE GOAL STATUS: HCPCS

## 2017-12-28 PROCEDURE — 83735 ASSAY OF MAGNESIUM: CPT | Performed by: INTERNAL MEDICINE

## 2017-12-28 PROCEDURE — 74011250637 HC RX REV CODE- 250/637: Performed by: INTERNAL MEDICINE

## 2017-12-28 PROCEDURE — 97535 SELF CARE MNGMENT TRAINING: CPT

## 2017-12-28 PROCEDURE — G8987 SELF CARE CURRENT STATUS: HCPCS

## 2017-12-28 RX ORDER — HYDROCODONE BITARTRATE AND ACETAMINOPHEN 5; 325 MG/1; MG/1
1 TABLET ORAL
Qty: 20 TAB | Refills: 0 | Status: SHIPPED | OUTPATIENT
Start: 2017-12-28

## 2017-12-28 RX ORDER — METHYLPREDNISOLONE 4 MG/1
TABLET ORAL
Qty: 1 DOSE PACK | Refills: 0 | Status: SHIPPED | OUTPATIENT
Start: 2017-12-28

## 2017-12-28 RX ORDER — ALBUTEROL SULFATE 90 UG/1
1 AEROSOL, METERED RESPIRATORY (INHALATION)
Qty: 1 INHALER | Refills: 0 | Status: SHIPPED | OUTPATIENT
Start: 2017-12-28

## 2017-12-28 RX ADMIN — FINASTERIDE 5 MG: 5 TABLET, FILM COATED ORAL at 09:58

## 2017-12-28 RX ADMIN — Medication 10 ML: at 05:32

## 2017-12-28 RX ADMIN — CARVEDILOL 3.12 MG: 3.12 TABLET, FILM COATED ORAL at 07:08

## 2017-12-28 RX ADMIN — HYDROCODONE BITARTRATE AND ACETAMINOPHEN 1 TABLET: 5; 325 TABLET ORAL at 13:45

## 2017-12-28 RX ADMIN — Medication 10 ML: at 13:46

## 2017-12-28 NOTE — PROGRESS NOTES
Problem: Falls - Risk of  Goal: *Absence of Falls  Document Jennifer Fall Risk and appropriate interventions in the flowsheet.    Outcome: Progressing Towards Goal  Fall Risk Interventions:  Mobility Interventions: Communicate number of staff needed for ambulation/transfer         Medication Interventions: Teach patient to arise slowly    Elimination Interventions: Call light in reach    History of Falls Interventions: Room close to nurse's station        Problem: Patient Education: Go to Patient Education Activity  Goal: Patient/Family Education  Outcome: Progressing Towards Goal  PT consult

## 2017-12-28 NOTE — ROUTINE PROCESS
Bedside and Verbal shift change report given to Eladia Zhao RN (oncoming nurse) by Tawnya Newell RN (offgoing nurse). Report included the following information SBAR.

## 2017-12-28 NOTE — PROGRESS NOTES
Reviewed medical chart; met with the patient at the bedside. Patient will discharge to [facility] today. Spoke with Shedrick Skiff, M#[phone], contact at this facility. She has arranged for a respite room for the patient later today and for transportation. The [address] will pick the patient up at 3PM today; he will need to be wheeled out to the discharge area on [address]. William Hernandez [facility] which were obtained from the MD and faxed along with this discharge instructions to F#[phone]. William Hernandez indicated that report would NOT need to be called to their facility. Care Management will continue to follow his disposition.    SOLO Sewell

## 2017-12-28 NOTE — PROGRESS NOTES
Bedside and Verbal shift change report given to LINDA Cabello  (oncoming nurse) by Jason Schultz (offgoing nurse). Report included the following information SBAR and Kardex.

## 2017-12-28 NOTE — DISCHARGE SUMMARY
Discharge Summary       PATIENT ID: Liam Cade  MRN: [id number]   YOB: 1920    DATE OF ADMISSION: 12/27/2017 11:23 AM    DATE OF DISCHARGE: 12/28/2017   PRIMARY CARE PROVIDER: Alex Cox MD     ATTENDING PHYSICIAN: Nneka Kenny  DISCHARGING PROVIDER: Ronnie Newsome NP    To contact this individual call [phone] and ask the  to page. If unavailable ask to be transferred the Adult Hospitalist Department. CONSULTATIONS: ED CONSULT TO SENIOR SERVICES PHYSICAL THERAPY  IP CONSULT TO HOSPITALIST    PROCEDURES/SURGERIES: * No surgery found *    ADMITTING DIAGNOSES & HOSPITAL COURSE:   Dx: URI, CKD stage 4    80-year-old white male presents to the Emergency Department from home [facility]) in which he states he is weak, which has been present for the previous 2-3 days. This appears to be associated with an upper respiratory tract infection. It was believed at the [facility] that patient had been exposed to influenza A as multiple family members have it. He was given a prescription for Tamiflu; however, did not get this filled and his weakness continued. Last night, he had neck pain in which he was given Norco, which he normally takes and was causing him to have nausea and one episode of vomiting. He reports a productive cough of yellow sputum. The only other complaint he has is that he has had a decreased appetite for two days. He denies any fever, chills, chest pain, shortness of breath, edema, stuffiness, epistaxis, wheezing, diarrhea, constipation, change of frequency or urgency of urination, tingling, numbness. Patient negative for flu. CXR negative. Patient reports sxs improved. VSS. Patient seen by PT/OT who recommended assisted living. Patient set up for [facility] respite care at [facility] ([facility])  with PT/OT. Patient stable for discharge home.      DISCHARGE DIAGNOSES / PLAN:      URI   -o2 sats stable on room air   -CXR negative   -medrol dose pack, albuterol inhaler prn   -encourage po fluids   -hold on abx as likely viral, afebrile, improved with IVF     CKD stage 4   -appears to be at baseline   -repeat BMP on Monday   -avoid nephrotoxic meds     Generalized weakness/fatigue  -likely d/t URI   -PT/OT at [facility]     HTN   -stable   -continue home Coreg    Gouty Arthritis   -resume home pain meds        PENDING TEST RESULTS:   At the time of discharge the following test results are still pending: none    FOLLOW UP APPOINTMENTS:    Follow-up Information     Follow up With Details Comments Contact Info    Fracisco Francisco MD   Patient can only remember the practice name and not the physician             ADDITIONAL CARE RECOMMENDATIONS:   -Repeat BMP on Monday to check on kidney levels. -Start taking Albuterol inhaler as needed for shortness of breath and Prednisone taper for your upper respiratory infection. DIET: Regular    ACTIVITY: Activity as tolerated and PT/OT Eval and Treat    WOUND CARE: none    EQUIPMENT needed: none      DISCHARGE MEDICATIONS:  Current Discharge Medication List      START taking these medications    Details   albuterol (PROVENTIL HFA, VENTOLIN HFA, PROAIR HFA) 90 mcg/actuation inhaler Take 1 Puff by inhalation every four (4) hours as needed for Wheezing. Qty: 1 Inhaler, Refills: 0         CONTINUE these medications which have CHANGED    Details   methylPREDNISolone (MEDROL, MOMO,) 4 mg tablet Directions per dose pack  Qty: 1 Dose Pack, Refills: 0         CONTINUE these medications which have NOT CHANGED    Details   HYDROcodone-acetaminophen (NORCO) 5-325 mg per tablet Take 1 Tab by mouth every four (4) hours as needed for Pain. Max Daily Amount: 6 Tabs. Qty: 20 Tab, Refills: 0      carvedilol (COREG) 3.125 mg tablet Take 3.125 mg by mouth two (2) times daily (with meals). finasteride (PROSCAR) 5 mg tablet Take 5 mg by mouth daily. amitriptyline (ELAVIL) 25 mg tablet Take 25 mg by mouth nightly.       aspirin (ASPIRIN) 325 mg tablet Take 325 mg by mouth every four (4) hours as needed for Pain. NOTIFY YOUR PHYSICIAN FOR ANY OF THE FOLLOWING:   Fever over 101 degrees for 24 hours. Chest pain, shortness of breath, fever, chills, nausea, vomiting, diarrhea, change in mentation, falling, weakness, bleeding. Severe pain or pain not relieved by medications. Or, any other signs or symptoms that you may have questions about. DISPOSITION:    Home With:   OT  PT  HH  RN       Long term SNF/Inpatient Rehab   X Independent/assisted living-assisted living for respite care    Hospice    Other:       PATIENT CONDITION AT DISCHARGE:     Functional status    Poor     Deconditioned     Independent      Cognition   X  Lucid     Forgetful     Dementia      Catheters/lines (plus indication)    Muller     PICC     PEG    X None      Code status   X  Full code     DNR      PHYSICAL EXAMINATION AT DISCHARGE:  General: Elderly male resting in bed in no acute distress  EENT: EOMI. Anicteric sclerae. Oral mucous moist, oropharynx benign  Resp: Exp wheeze throughout. No accessory muscle use  CV: Regular rhythm, normal rate, no murmurs, gallops, rubs  GI: Soft, non distended, non tender. normoactive bowel sounds,   Extremities: No edema, warm, 2+ pulses throughout  Neurologic: Moves all extremities. AAOx3, CN II-XII grossly intact  Psych: Good insight. Not anxious nor agitated.   Skin: Good Turgor, no rashes or ulcers      CHRONIC MEDICAL DIAGNOSES:  Problem List as of 12/28/2017  Date Reviewed: 12/28/2017          Codes Class Noted - Resolved    * (Principal)URI (upper respiratory infection) ICD-10-CM: J06.9  ICD-9-CM: 465.9  12/28/2017 - Present        HTN (hypertension) (Chronic) ICD-10-CM: I10  ICD-9-CM: 401.9  12/28/2017 - Present        Stage 4 chronic kidney disease ([facility] [address] (Chronic) ICD-10-CM: N18.4  ICD-9-CM: 585.4  12/27/2017 - Present        Weakness generalized ICD-10-CM: R53.1  ICD-9-CM: 780.79  11/18/2013 - Present              Greater than 30 minutes were spent with the patient on counseling and coordination of care    Signed:   Erin Sargent NP  12/28/2017  9:01 AM

## 2017-12-28 NOTE — PROGRESS NOTES
Problem: Mobility Impaired (Adult and Pediatric)  Goal: *Acute Goals and Plan of Care (Insert Text)  Physical Therapy Goals  Initiated 12/28/2017  1. Patient will move from supine to sit and sit to supine  in bed with modified independence within 7 day(s). 2.  Patient will transfer from bed to chair and chair to bed with modified independence using the least restrictive device within 7 day(s). 3.  Patient will perform sit to stand with modified independence within 7 day(s). 4.  Patient will ambulate with modified independence for 100 feet with the least restrictive device within 7 day(s). physical Therapy TREATMENT  Patient: John Richmond (70 y.o. male)  Date: 12/28/2017  Diagnosis: SHEILA (acute kidney injury) ([facility] [address] URI (upper respiratory infection)       Precautions: Fall    ASSESSMENT:  Patient making steady progress toward goals. Received supine in bed and agreeable to PT session. Patient seen in ED for therapy evaluation. Patient currently needing CGA for bed mobility and modA for sit to stand transfers. Patient amb approx 15 feet with Tayo and Rw to restroom. Worked with OT on ADL's in restroom and then ambulated back to bedside chair with Tayo and RW. Patient with fair balance overall and is very weak. Do not feel comfortable with patient DC to independent living setting. Per case management he may be going back to [facility] but will be in 'respite' assisted living. He will certainly need assistance with ambulation in this setting and [facility] in this setting. Anticipate he will eventually be able to return to his independent living apartment. Progression toward goals:  []    Improving appropriately and progressing toward goals  [x]    Improving slowly and progressing toward goals  []    Not making progress toward goals and plan of care will be adjusted     PLAN:  Patient continues to benefit from skilled intervention to address the above impairments. Continue treatment per established plan of care. Discharge Recommendations:  Home Health in longterm setting  Further Equipment Recommendations for Discharge:  None-owns Rw     SUBJECTIVE:   Patient stated I actually feel fine but I am so weak.     OBJECTIVE DATA SUMMARY:   Critical Behavior:  Neurologic State: Alert  Orientation Level: Oriented X4  Cognition: Appropriate decision making, Appropriate for age attention/concentration, Appropriate safety awareness, Follows commands  Safety/Judgement: Awareness of environment, Fall prevention, Insight into deficits  Functional Mobility Training:  Bed Mobility:  Rolling: Contact guard assistance  Supine to Sit: Contact guard assistance  Sit to Supine: Contact guard assistance  Scooting: Supervision        Transfers:  Sit to Stand: Moderate assistance (using RW Simultaneous filing. User may not have seen previous data.)  Stand to Sit: Moderate assistance (using RW Simultaneous filing. User may not have seen previous data.)                             Balance:  Sitting: Intact (Simultaneous filing. User may not have seen previous data.)  Standing: Impaired; With support (RW Simultaneous filing. User may not have seen previous data.)  Standing - Static: Fair;Constant support (Simultaneous filing. User may not have seen previous data.)  Standing - Dynamic : Fair (Simultaneous filing. User may not have seen previous data.)  Ambulation/Gait Training:  Distance (ft): 15 Feet (ft) (x 2)  Assistive Device: Gait belt;Walker, rolling  Ambulation - Level of Assistance: Minimal assistance; Additional time;Assist x1        Gait Abnormalities: Decreased step clearance        Base of Support: Narrowed     Speed/Johanna: Pace decreased (<100 feet/min); Shuffled; Slow  Step Length: Left shortened;Right shortened       Pain:  Pain Scale 1: Numeric (0 - 10)  Pain Intensity 1: 0              Activity Tolerance:   VSS  Please refer to the flowsheet for vital signs taken during this treatment.   After treatment:   [x]    Patient left in no apparent distress sitting up in chair  []    Patient left in no apparent distress in bed  [x]    Call bell left within reach  [x]    Nursing notified  []    Caregiver present  []    Bed alarm activated    COMMUNICATION/COLLABORATION:   The patients plan of care was discussed with: Physical Therapist, Occupational Therapist and Registered Nurse    Bhakti Peter, PT, DPT   Time Calculation: 29 mins

## 2017-12-28 NOTE — PROGRESS NOTES
Problem: Self Care Deficits Care Plan (Adult)  Goal: *Acute Goals and Plan of Care (Insert Text)  Occupational Therapy Goals  Initiated 12/28/2017  1. Patient will perform upper body dressing with supervision/set-up within 7 day(s). 2.  Patient will perform upper body dressing with supervision/set-up within 7 day(s). 3.  Patient will perform lower body dressing with supervision/set-up within 7 day(s). 4.  Patient will perform toilet transfers with supervision/set-up within 7 day(s). 5.  Patient will perform all aspects of toileting with supervision/set-up within 7 day(s). 6.  Patient will utilize energy conservation techniques during functional activities with verbal cues within 7 day(s). Occupational Therapy EVALUATION  Patient: Betina Jones (32 y.o. male)  Date: 12/28/2017  Primary Diagnosis: SHEILA (acute kidney injury) [facility])        Precautions: Fall    ASSESSMENT :  Based on the objective data described below, the patient presents with general weakness, SOB with activity, impaired standing balance, impaired functional mobility, impaired ADL independence, and fall risk. Patient alert, Ox4, and agreeable to therapy. Patient requires CGA for bed mobility, min-A for support standing and ambulating with occasional LOB, and mod-A for sit-stand transfers. Patient requires CGA/ min-A for ADLs and would benefit from additional occupational therapy. Patient performing ADLs and mobility tasks at indep/ mod-I level at baseline. Patient resides alone in independent living facility and is not safe to d/c home due to current level of assistance and fall risk. Recommend rehab for additional therapy and assistance since patient is below functional baseline. Patient will benefit from skilled intervention to address the above impairments.   Patients rehabilitation potential is considered to be Good  Factors which may influence rehabilitation potential include:   [x]             None noted  []             Mental ability/status  []             Medical condition  []             Home/family situation and support systems  []             Safety awareness  []             Pain tolerance/management  []             Other:      PLAN :  Recommendations and Planned Interventions:  [x]               Self Care Training                  [x]        Therapeutic Activities  [x]               Functional Mobility Training    []        Cognitive Retraining  [x]               Therapeutic Exercises           [x]        Endurance Activities  [x]               Balance Training                   []        Neuromuscular Re-Education  []               Visual/Perceptual Training     []   Home Safety Training  [x]               Patient Education                 []        Family Training/Education  []               Other (comment):    Frequency/Duration: Patient will be followed by occupational therapy 5 times a week to address goals. Discharge Recommendations: Rehab  Further Equipment Recommendations for Discharge: none     SUBJECTIVE:   Patient stated I can't walk very far before my legs get weak.     OBJECTIVE DATA SUMMARY:   HISTORY:   Past Medical History:   Diagnosis Date    Actinic keratosis     Allergic rhinitis     Anemia     Arthritis     C-spine degeneration    Benign prostatic hypertrophy     Bundle branch block     Cervical disc disorder     Chronic kidney disease     \"renal disorder\"    Cough     GERD (gastroesophageal reflux disease)     Gout     Hypercholesterolemia     Hypertension     Kidney disease     Osteoarthritis     Syncope    History reviewed. No pertinent surgical history. Prior Level of Function/Environment/Context: resides alone in independent living facility,  Indep/ mod-I for ADLs, receives assistance for household IADLs (cooking and cleaning) 5 days/week.      Expanded or extensive additional review of patient history:     Home Situation  Home Environment: Independent living  # Steps to Enter: 0  One/Two Story Residence: One story  Living Alone: Yes  Support Systems: Child(ruby)  Patient Expects to be Discharged to[de-identified] Assisted living  Current DME Used/Available at Home: Cane, straight, Raised toilet seat, Walker, rollator  Tub or Shower Type: Shower  [x]  Right hand dominant   []  Left hand dominant    EXAMINATION OF PERFORMANCE DEFICITS:  Cognitive/Behavioral Status:  Neurologic State: Alert  Orientation Level: Oriented X4  Cognition: Appropriate decision making; Appropriate for age attention/concentration; Appropriate safety awareness; Follows commands  Perception: Appears intact  Perseveration: No perseveration noted  Safety/Judgement: Awareness of environment; Fall prevention; Insight into deficits    Skin:  Visible skin appears intact    Edema: none noted    Hearing: Auditory  Auditory Impairment: Hard of hearing, bilateral, Hearing aid(s), Hard of hearing, right side    Vision/Perceptual:                      Diplopia: No    Acuity: Within Defined Limits    Corrective Lenses: Glasses    Range of Motion:    AROM: Generally decreased, functional  PROM: Generally decreased, functional                      Strength:    Strength: Generally decreased, functional                Coordination:  Coordination: Generally decreased, functional  Fine Motor Skills-Upper: Left Intact; Right Intact    Gross Motor Skills-Upper: Left Intact; Right Intact    Tone & Sensation:    Tone: Normal  Sensation: Intact                      Balance:  Sitting: Intact   Standing: Impaired; With support   Standing - Static: Fair;Constant support  Standing - Dynamic : Fair     Functional Mobility and Transfers for ADLs:  Bed Mobility:  Rolling: Contact guard assistance  Supine to Sit: Contact guard assistance  Sit to Supine: Contact guard assistance  Scooting: Supervision    Transfers:  Sit to Stand: Moderate assistance (using RW)  Stand to Sit: Moderate assistance (using RW)  Toilet Transfer :  Moderate assistance (using RW)    ADL Assessment:  Feeding: Independent    Oral Facial Hygiene/Grooming: Contact guard assistance (seated on toilet)    Bathing: Moderate assistance (inferred)    Upper Body Dressing: Minimum assistance (inferred)    Lower Body Dressing: Minimum assistance (inferred)    Toileting: Minimum assistance (inferred)                ADL Intervention and task modifications:     Patient received supine in bed and agreeable to therapy. Performed supine- sitting EOB with CGA using bed rail. Required mod-A for sit-stand transfer and ambulated to bathroom with min-A using RW. Required seated rest break on toilet due to weakness. Performed grooming (washing face and brushing teeth) seated on toilet. Required mod-A for sit-stand from toilet using RW and grab bar. Ambulated to chair with min-A for support standing and LOB. Required mod-A for stand-sit in chair for controlled descent. Cognitive Retraining  Safety/Judgement: Awareness of environment; Fall prevention; Insight into deficits      Functional Measure:  Barthel Index:    Bathin  Bladder: 10  Bowels: 10  Groomin  Dressin  Feeding: 10  Mobility: 0  Stairs: 0  Toilet Use: 5  Transfer (Bed to Chair and Back): 10  Total: 55       Barthel and G-code impairment scale:  Percentage of impairment CH  0% CI  1-19% CJ  20-39% CK  40-59% CL  60-79% CM  80-99% CN  100%   Barthel Score 0-100 100 99-80 79-60 59-40 20-39 1-19   0   Barthel Score 0-20 20 17-19 13-16 9-12 5-8 1-4 0      The Barthel ADL Index: Guidelines  1. The index should be used as a record of what a patient does, not as a record of what a patient could do. 2. The main aim is to establish degree of independence from any help, physical or verbal, however minor and for whatever reason. 3. The need for supervision renders the patient not independent. 4. A patient's performance should be established using the best available evidence.  Asking the patient, friends/relatives and nurses are the usual sources, but direct observation and common sense are also important. However direct testing is not needed. 5. Usually the patient's performance over the preceding 24-48 hours is important, but occasionally longer periods will be relevant. 6. Middle categories imply that the patient supplies over 50 per cent of the effort. 7. Use of aids to be independent is allowed. Precilla Hansen., Barthel, D.W. (0143). Functional evaluation: the Barthel Index. [address] [facility] (14)2. PAMELA Guy, Donnell Golden., Javier Salomon., William, [facility] (1999). Measuring the change indisability after inpatient rehabilitation; comparison of the responsiveness of the Barthel Index and Functional [place] Measure. Journal of Neurology, Neurosurgery, and Psychiatry, 66(4), 938-435. MYNOR Pompa, NEETA Arteaga, & Blaise Schirmer, M.A. (2004.) Assessment of post-stroke quality of life in cost-effectiveness studies: The usefulness of the Barthel Index and the EuroQoL-5D. Quality of Life Research, 13, 262-40       G codes: In compliance with CMSs Claims Based Outcome Reporting, the following G-code set was chosen for this patient based on their primary functional limitation being treated: The outcome measure chosen to determine the severity of the functional limitation was the Barthel Index with a score of 55/100 which was correlated with the impairment scale. ?  Self Care:     - CURRENT STATUS: CK - 40%-59% impaired, limited or restricted    - GOAL STATUS: CI - 1%-19% impaired, limited or restricted    - D/C STATUS:  ---------------To be determined---------------     Occupational Therapy Evaluation Charge Determination   History Examination Decision-Making   LOW Complexity : Brief history review  LOW Complexity : 1-3 performance deficits relating to physical, cognitive , or psychosocial skils that result in activity limitations and / or participation restrictions  LOW Complexity : No comorbidities that affect functional and no verbal or physical assistance needed to complete eval tasks       Based on the above components, the patient evaluation is determined to be of the following complexity level: LOW      Activity Tolerance:   VSS    After treatment:   [x] Patient left in no apparent distress sitting up in chair  [] Patient left in no apparent distress in bed  [x] Call bell left within reach  [x] Nursing notified  [] Caregiver present  [] Bed alarm activated    COMMUNICATION/EDUCATION:   The patients plan of care was discussed with: Physical Therapist and Registered Nurse. [x] Home safety education was provided and the patient/caregiver indicated understanding. [x] Patient/family have participated as able in goal setting and plan of care. [x] Patient/family agree to work toward stated goals and plan of care. [] Patient understands intent and goals of therapy, but is neutral about his/her participation. [] Patient is unable to participate in goal setting and plan of care. This patients plan of care is appropriate for delegation to [facility].     Thank you for this referral.  Hanna Raymond OT  Time Calculation: 25 mins

## 2017-12-28 NOTE — DISCHARGE INSTRUCTIONS
Discharge Instructions       PATIENT ID: Tamara Darden  MRN: [id number]   YOB: 1920    DATE OF ADMISSION: 12/27/2017 11:23 AM    DATE OF DISCHARGE: 12/28/2017    PRIMARY CARE PROVIDER: Gordo Andrade MD     ATTENDING PHYSICIAN: Haydee Tuttle MD  DISCHARGING PROVIDER: Eugenio Solorio NP    To contact this individual call [phone] and ask the  to page. If unavailable ask to be transferred the Adult Hospitalist Department. DISCHARGE DIAGNOSES ***    CONSULTATIONS: ED CONSULT TO SENIOR SERVICES PHYSICAL THERAPY  IP CONSULT TO HOSPITALIST    PROCEDURES/SURGERIES: * No surgery found *    PENDING TEST RESULTS:   At the time of discharge the following test results are still pending: ***    FOLLOW UP APPOINTMENTS:   Follow-up Information     Follow up With Details Comments Contact Info    Fracisco Francisco MD   Patient can only remember the practice name and not the physician             ADDITIONAL CARE RECOMMENDATIONS:   -Repeat BMP on Monday to check on kidney levels. -Start taking Albuterol inhaler as needed for shortness of breath and Prednisone taper for your upper respiratory infection.      DIET: Regular     ACTIVITY: Activity as tolerated and PT/OT Eval and Treat     WOUND CARE: none     EQUIPMENT needed: none       DISCHARGE MEDICATIONS:   Albuterol (AccuNeb, Proventil, Proventil HFA, Ventolin HFA) - (By breathing)   Why this medicine is used:   Treats or prevents bronchospasm. Contact a nurse or doctor right away if you have:  · Trouble breathing, increased wheezing or cough, chest tightness  · Fast, pounding, or uneven heartbeat; chest pain  · Muscle cramps, nausea, vomiting  · Dry mouth, increased thirst     Common side effects:  · Tremors, nervousness  · Cough, sore throat, runny or stuffy nose  · Headache  © 2017 [facility] Information is for End User's use only and may not be sold, redistributed or otherwise used for commercial purposes.      Prednisone (predniSONE Intensol, Prednicot, Deltasone, Marry) - (By mouth)   Why this medicine is used:   Treats many diseases and conditions, especially problems related to inflammation. Contact a nurse or doctor right away if you have:  · Rapid weight gain; swelling in your hands, ankles, or feet  · Severe stomach pain, nausea, vomiting, or red or black stools  · Depression, unusual thoughts, feelings, or behaviors, trouble sleeping  · Fever, chills, cough, sore throat, and body aches  · Trouble seeing, eye pain     Common side effects:  · Increased appetite, weight gain  · Round, puffy face  · Muscle pain, weakness  © 2017 [facility] Information is for End User's use only and may not be sold, redistributed or otherwise used for commercial purposes. See Medication Reconciliation Form    · It is important that you take the medication exactly as they are prescribed. · Keep your medication in the bottles provided by the pharmacist and keep a list of the medication names, dosages, and times to be taken in your wallet. · Do not take other medications without consulting your doctor. NOTIFY YOUR PHYSICIAN FOR ANY OF THE FOLLOWING:   Fever over 101 degrees for 24 hours. Chest pain, shortness of breath, fever, chills, nausea, vomiting, diarrhea, change in mentation, falling, weakness, bleeding. Severe pain or pain not relieved by medications. Or, any other signs or symptoms that you may have questions about. DISPOSITION:    Home With:   OT  PT  HH  RN       SNF/Inpatient Rehab/LTAC   X assisted living    Hospice    Other:       PROBLEM LIST Updated:   Yes X       Signed:   Sandee Rivero NP  12/28/2017  9:12 AM

## 2017-12-29 ENCOUNTER — APPOINTMENT (OUTPATIENT)
Dept: GENERAL RADIOLOGY | Age: 82
End: 2017-12-29
Attending: STUDENT IN AN ORGANIZED HEALTH CARE EDUCATION/TRAINING PROGRAM
Payer: MEDICARE

## 2017-12-29 ENCOUNTER — HOSPITAL ENCOUNTER (EMERGENCY)
Age: 82
Discharge: HOME | End: 2017-12-29
Attending: STUDENT IN AN ORGANIZED HEALTH CARE EDUCATION/TRAINING PROGRAM
Payer: MEDICARE

## 2017-12-29 VITALS
OXYGEN SATURATION: 95 % | WEIGHT: 186 LBS | BODY MASS INDEX: 23.87 KG/M2 | DIASTOLIC BLOOD PRESSURE: 68 MMHG | RESPIRATION RATE: 18 BRPM | HEIGHT: 74 IN | SYSTOLIC BLOOD PRESSURE: 135 MMHG | HEART RATE: 68 BPM | TEMPERATURE: 98.2 F

## 2017-12-29 DIAGNOSIS — G93.31 POSTVIRAL FATIGUE SYNDROME: Primary | ICD-10-CM

## 2017-12-29 LAB
ALBUMIN SERPL-MCNC: 2.9 G/DL (ref 3.5–5)
ALBUMIN/GLOB SERPL: 0.7 {RATIO} (ref 1.1–2.2)
ALP SERPL-CCNC: 68 U/L (ref 45–117)
ALT SERPL-CCNC: 28 U/L (ref 12–78)
ANION GAP SERPL CALC-SCNC: 7 MMOL/L (ref 5–15)
AST SERPL-CCNC: 47 U/L (ref 15–37)
BASOPHILS # BLD: 0 K/UL (ref 0–0.1)
BASOPHILS NFR BLD: 0 % (ref 0–1)
BILIRUB SERPL-MCNC: 0.4 MG/DL (ref 0.2–1)
BUN SERPL-MCNC: 48 MG/DL (ref 6–20)
BUN/CREAT SERPL: 23 (ref 12–20)
CALCIUM SERPL-MCNC: 8.4 MG/DL (ref 8.5–10.1)
CHLORIDE SERPL-SCNC: 100 MMOL/L (ref 97–108)
CO2 SERPL-SCNC: 27 MMOL/L (ref 21–32)
CREAT SERPL-MCNC: 2.11 MG/DL (ref 0.7–1.3)
EOSINOPHIL # BLD: 0 K/UL (ref 0–0.4)
EOSINOPHIL NFR BLD: 1 % (ref 0–7)
ERYTHROCYTE [DISTWIDTH] IN BLOOD BY AUTOMATED COUNT: 15.7 % (ref 11.5–14.5)
GLOBULIN SER CALC-MCNC: 3.9 G/DL (ref 2–4)
GLUCOSE SERPL-MCNC: 76 MG/DL (ref 65–100)
HCT VFR BLD AUTO: 35.5 % (ref 36.6–50.3)
HGB BLD-MCNC: 11.5 G/DL (ref 12.1–17)
LYMPHOCYTES # BLD: 0.8 K/UL (ref 0.8–3.5)
LYMPHOCYTES NFR BLD: 11 % (ref 12–49)
MCH RBC QN AUTO: 29.2 PG (ref 26–34)
MCHC RBC AUTO-ENTMCNC: 32.4 G/DL (ref 30–36.5)
MCV RBC AUTO: 90.1 FL (ref 80–99)
MONOCYTES # BLD: 1.4 K/UL (ref 0–1)
MONOCYTES NFR BLD: 18 % (ref 5–13)
NEUTS SEG # BLD: 5.5 K/UL (ref 1.8–8)
NEUTS SEG NFR BLD: 70 % (ref 32–75)
PLATELET # BLD AUTO: 203 K/UL (ref 150–400)
POTASSIUM SERPL-SCNC: 4.4 MMOL/L (ref 3.5–5.1)
PROT SERPL-MCNC: 6.8 G/DL (ref 6.4–8.2)
RBC # BLD AUTO: 3.94 M/UL (ref 4.1–5.7)
SODIUM SERPL-SCNC: 134 MMOL/L (ref 136–145)
WBC # BLD AUTO: 7.7 K/UL (ref 4.1–11.1)

## 2017-12-29 PROCEDURE — 36415 COLL VENOUS BLD VENIPUNCTURE: CPT | Performed by: STUDENT IN AN ORGANIZED HEALTH CARE EDUCATION/TRAINING PROGRAM

## 2017-12-29 PROCEDURE — 85025 COMPLETE CBC W/AUTO DIFF WBC: CPT | Performed by: STUDENT IN AN ORGANIZED HEALTH CARE EDUCATION/TRAINING PROGRAM

## 2017-12-29 PROCEDURE — 80053 COMPREHEN METABOLIC PANEL: CPT | Performed by: STUDENT IN AN ORGANIZED HEALTH CARE EDUCATION/TRAINING PROGRAM

## 2017-12-29 PROCEDURE — 97161 PT EVAL LOW COMPLEX 20 MIN: CPT

## 2017-12-29 PROCEDURE — G8980 MOBILITY D/C STATUS: HCPCS

## 2017-12-29 PROCEDURE — 99284 EMERGENCY DEPT VISIT MOD MDM: CPT

## 2017-12-29 PROCEDURE — G8979 MOBILITY GOAL STATUS: HCPCS

## 2017-12-29 PROCEDURE — G8978 MOBILITY CURRENT STATUS: HCPCS

## 2017-12-29 PROCEDURE — 97116 GAIT TRAINING THERAPY: CPT

## 2017-12-29 PROCEDURE — 71020 XR CHEST PA LAT: CPT

## 2017-12-29 NOTE — PROGRESS NOTES
SSED/CM consult received and appreciated. EMR reviewed. Noted patient placed at [address] for [address] on 12/28/17. Patient presents today w/ complaints of flu-like symptoms. Call placed to Marilyn Rosa VM left. Contact made to Julia Sol and provided Marko Matias -[phone] - CM confirmed location for patient to return back. Facility expressed concerns about change in medical status this morning - informed medically cleared in ED. AL will communicate w/ facility NP to obtain order for Tylenol if symptoms return. Report can be called to Nurse Eve Low. Contact made w/ Bárbara Jacob (son) informed of d/c and options for transport. Son requesting additional medical updates prior to patient leaving ED. Ye Rebolledo received call and questions answered. CM  Communicated back w/ son and DNL - will  patient from ED  /waiting area secondary to current treatment for the flu. Nursing provided # for Report [phone]. Patient informed of discharge. No additional needs verbalized at this time.

## 2017-12-29 NOTE — DISCHARGE INSTRUCTIONS
Fatigue: Care Instructions  Your Care Instructions    Fatigue is a feeling of tiredness, exhaustion, or lack of energy. You may feel fatigue because of too much or not enough activity. It can also come from stress, lack of sleep, boredom, and poor diet. Many medical problems, such as viral infections, can cause fatigue. Emotional problems, especially depression, are often the cause of fatigue. Fatigue is most often a symptom of another problem. Treatment for fatigue depends on the cause. For example, if you have fatigue because you have a certain health problem, treating this problem also treats your fatigue. If depression or anxiety is the cause, treatment may help. Follow-up care is a key part of your treatment and safety. Be sure to make and go to all appointments, and call your doctor if you are having problems. It's also a good idea to know your test results and keep a list of the medicines you take. How can you care for yourself at home? · Get regular exercise. But don't overdo it. Go back and forth between rest and exercise. · Get plenty of rest.  · Eat a healthy diet. Do not skip meals, especially breakfast.  · Reduce your use of caffeine, tobacco, and alcohol. Caffeine is most often found in coffee, tea, cola drinks, and chocolate. · Limit medicines that can cause fatigue. This includes tranquilizers and cold and allergy medicines. When should you call for help? Watch closely for changes in your health, and be sure to contact your doctor if:  ? · You have new symptoms such as fever or a rash. ? · Your fatigue gets worse. ? · You have been feeling down, depressed, or hopeless. Or you may have lost interest in things that you usually enjoy. ? · You are not getting better as expected. Where can you learn more? Go to http://js-rachel.info/. Enter L161 in the search box to learn more about \"Fatigue: Care Instructions. \"  Current as of: March 20, 2017  Content Version: 11.4  © 6099-6108 [employer]. Care instructions adapted under license by [employer] (which disclaims liability or warranty for this information). If you have questions about a medical condition or this instruction, always ask your healthcare professional. [address] any warranty or liability for your use of this information.

## 2017-12-29 NOTE — ED PROVIDER NOTES
HPI Comments: 80 y.o. male with past medical history significant for AR, cough, anemia, CKD, arthritis, syncope, high cholesterol, actinic keratosis, HTN, GERD, and gout who presents from [facility] with chief complaint of weakness. The pt was seen here one day ago and became weak when he got back to his place. The pt had a fall and injured his 5th toe of the L foot. The pt's cough has been the same. The pt denies nausea, vomiting, diarrhea, and changes in appetite. There are no other acute medical concerns at this time. Social hx: nonsmoker, no EtOH use  PCP: Fracisco Francisco MD  Note written by Chery Ibrahim, as dictated by Ju Lainez MD 2:08 PM        The history is provided by the patient. No  was used. Past Medical History:   Diagnosis Date    Actinic keratosis     Allergic rhinitis     Anemia     Arthritis     C-spine degeneration    Benign prostatic hypertrophy     Bundle branch block     Cervical disc disorder     Chronic kidney disease     \"renal disorder\"    Cough     GERD (gastroesophageal reflux disease)     Gout     Hypercholesterolemia     Hypertension     Kidney disease     Osteoarthritis     Syncope        No past surgical history on file. No family history on file. Social History     Social History    Marital status:      Spouse name: N/A    Number of children: N/A    Years of education: N/A     Occupational History    Not on file. Social History Main Topics    Smoking status: Never Smoker    Smokeless tobacco: Never Used    Alcohol use No    Drug use: No    Sexual activity: Not on file     Other Topics Concern    Not on file     Social History Narrative         ALLERGIES: Pamelor [nortriptyline]; Pcn [penicillins]; and Sulfa (sulfonamide antibiotics)    Review of Systems   Constitutional: Negative for chills, diaphoresis, fatigue and fever.    HENT: Negative for congestion, rhinorrhea, sinus pressure, sore throat, trouble swallowing and voice change. Eyes: Negative for photophobia and visual disturbance. Respiratory: Positive for cough. Negative for chest tightness and shortness of breath. Cardiovascular: Negative for chest pain, palpitations and leg swelling. Gastrointestinal: Negative for abdominal pain, blood in stool, constipation, diarrhea, nausea and vomiting. Musculoskeletal: Negative for arthralgias, myalgias and neck pain. Skin: Positive for wound. Neurological: Negative for dizziness, weakness, light-headedness, numbness and headaches. All other systems reviewed and are negative. Vitals:    12/29/17 1139   BP: 111/53   Pulse: 67   Resp: 18   Temp: 98.5 °F (36.9 °C)   SpO2: 98%   Weight: 84.4 kg (186 lb)   Height: 6' 2\" (1.88 m)            Physical Exam   Constitutional: He is oriented to person, place, and time. He appears well-developed and well-nourished. No distress. HENT:   Head: Normocephalic and atraumatic. Nose: Nose normal.   Mouth/Throat: Oropharynx is clear and moist. No oropharyngeal exudate. Eyes: Conjunctivae and EOM are normal. Right eye exhibits no discharge. Left eye exhibits no discharge. No scleral icterus. Neck: Normal range of motion. Neck supple. No JVD present. No tracheal deviation present. No thyromegaly present. Cardiovascular: Normal rate, regular rhythm, normal heart sounds and intact distal pulses. Exam reveals no gallop and no friction rub. No murmur heard. Pulmonary/Chest: Effort normal and breath sounds normal. No stridor. No respiratory distress. He has no wheezes. He has no rales. He exhibits no tenderness. Abdominal: Bowel sounds are normal. He exhibits no distension and no mass. There is no tenderness. There is no rebound. Musculoskeletal: Normal range of motion. He exhibits no edema or tenderness. Lymphadenopathy:     He has no cervical adenopathy. Neurological: He is alert and oriented to person, place, and time.  No cranial nerve deficit. Coordination normal.   Skin: Skin is warm and dry. No rash noted. He is not diaphoretic. No erythema. No pallor. L 5th toenail torn off, hemostatic;     Psychiatric: He has a normal mood and affect. His behavior is normal. Judgment and thought content normal.   Nursing note and vitals reviewed. Note written by Chery Henriquez, as dictated by Lei Helms MD 2:09 PM      MDM  Number of Diagnoses or Management Options  Postviral fatigue syndrome:   Diagnosis management comments: Post viral fatigue, fatigue, URI. 79 y/o male presenting to ED for weakness and left small toe pain from a toenail avulsion. Likely post viral fatigue as pt was seen and evaluated in ED yesterday and had a negative workup including labs/xray. Plan:  Cbc, cmp, cxr, PT to eval for gait and weakness. Reassessment:  Labs/imaging unremarkable and PT evaluated and pt did well with walker. Amount and/or Complexity of Data Reviewed  Clinical lab tests: ordered and reviewed  Tests in the radiology section of CPT®: ordered and reviewed  Review and summarize past medical records: yes    Risk of Complications, Morbidity, and/or Mortality  Presenting problems: moderate  Diagnostic procedures: moderate  Management options: moderate    Patient Progress  Patient progress: stable    ED Course       Procedures    3:04 PM  The patient has been reevaluated. The patient is ready for discharge. The patient's signs, symptoms, diagnosis, and discharge instructions have been discussed and the patient/ family has conveyed their understanding. The patient is to follow up as recommended or return to the ED should their symptoms worsen. Plan has been discussed and the patient is in agreement. LABORATORY TESTS:  No results found for this or any previous visit (from the past 12 hour(s)). IMAGING RESULTS:  XR CHEST PA LAT   Final Result        No results found.       MEDICATIONS GIVEN:  Medications - No data to display    IMPRESSION:  1. Postviral fatigue syndrome        PLAN:  1. Discharge Medication List as of 12/29/2017  4:22 PM        2.    Follow-up Information     Follow up With Details Comments Contact Info    Fracisco Francisco MD  If symptoms worsen Patient can only remember the practice name and not the physician      [address] [place] [address] EMERGENCY DEP  If symptoms worsen [address] [place] [address]  [phone]            Return to ED for new or worsening symptoms       Memo Winter MD

## 2017-12-29 NOTE — SENIOR SERVICES NOTE
physical Therapy Emergency Department EVALUATION/DISCHARGE  Patient: Neli Farley (25 y.o. male)  Date: 12/29/2017  Primary Diagnosis: There are no admission diagnoses documented for this encounter. Precautions: [facility]     ASSESSMENT :  Chart reviewed. Patient presents to the ED with increased weakness. However upon assessment, patient is doing much better than yesterday functionally. Patient was at [address] [place] [address] yesterday for cough and flu-like symptoms prior to being discharged to Assisted Living. Based on the objective data described below, the patient presents with continued decreased functional independence and and increased fall risk. Today patient requiring min/mod assist x 1 for bed mobility. With RW, patient CGA for all transfers and ambulation. Pt amb x 150 feet, CGA and RW. No unsteadiness or LOB noted. Patient will continue to benefit from [facility] level of care, however suspect that he will be able to return to independent living soon. No further PT needs at this time. Nursing notified. Further acute physical therapy is not indicated at this time. PLAN :  Discharge Recommendations:     []   Home with family  []   Skilled nursing facility  []   Admission to hospital with rehab likely needed  []   Inpatient rehab referral  []   Outpatient physical therapy referral  [x]   Other: back to [facility] for respite care    Further Equipment Recommendations for Discharge: none, has RW at home  []   Rolling walker with 5\" wheels  []   Crutches   []   Milena Hummingbird   []   Wheelchair   []   Other:     COMMUNICATION/EDUCATION:   Communication/Collaboration:  [x]   Fall prevention education was provided and the patient/caregiver indicated understanding. [x]   Patient/family have participated as able and agree with findings and recommendations. []   Patient is unable to participate in plan of care at this time.   Findings and recommendations were discussed with: MD physician and        SUBJECTIVE:   Patient stated I'll need to get back to room 510.     OBJECTIVE DATA SUMMARY:   HISTORY:    Past Medical History:   Diagnosis Date    Actinic keratosis     Allergic rhinitis     Anemia     Arthritis     C-spine degeneration    Benign prostatic hypertrophy     Bundle branch block     Cervical disc disorder     Chronic kidney disease     \"renal disorder\"    Cough     GERD (gastroesophageal reflux disease)     Gout     Hypercholesterolemia     Hypertension     Kidney disease     Osteoarthritis     Syncope    No past surgical history on file. Prior Level of Function/Home Situation: Pt lives in [address] [place] [address], though currently receiving \"respite care\" in [facility]. Patient has had a couple of falls recently secondary to feeling weak. At baseline, he lives alone in an apartment. He prepares his own meals and ambulates with a RW or cane. Personal factors and/or comorbidities impacting plan of care:     Home Situation  Home Environment: [place] [address] Name: [facility]  # Steps to Enter: 0  One/Two Story Residence: One story  Living Alone: Yes  Support Systems: Family member(s)  Patient Expects to be Discharged to[de-identified] Assisted living  Current DME Used/Available at Home: Walker, rolling, Cane, straight    EXAMINATION/PRESENTATION/DECISION MAKING:   Strength:    Strength: Generally decreased, functional     Tone & Sensation:   Tone: Normal  Sensation: Intact    Coordination:  Coordination: Generally decreased, functional    Functional Mobility:  Bed Mobility:  Supine to Sit: Minimum assistance;Assist x1; Moderate assistance  Sit to Supine: Minimum assistance;Assist x1  Transfers:  Sit to Stand: Contact guard assistance  Stand to Sit: Contact guard assistance  Bed to Chair: Contact guard assistance  Balance:   Sitting: Intact  Standing: Impaired  Standing - Static: Good;Constant support  Standing - Dynamic : Fair  Ambulation/Gait Training:  Distance (ft): 150 Feet (ft)  Assistive Device: Gait belt;Walker, rolling  Ambulation - Level of Assistance: Contact guard assistance  Gait Abnormalities: Decreased step clearance  Base of Support: Narrowed  Speed/Johanna: Slow  Step Length: Right shortened;Left shortened    Special Tests:  10 Meter walk test:  (Specify if any supplemental oxygen is used, the type, pre, during and post sats.)    Self-Selected Or Fast-Velocity: Self Selected Velocity  Trial 1: 12.2 Seconds  Trial 2: 12.2 Seconds  Trial 3: 12.1 Seconds   Average : 12.17 Seconds  Score: 0.82 m/s             Walking Speed (m/s)  Modifier Scale Age 52-63 Age 61-76 Age 66-77 Age 80-80    Male Female Male Female Male Female Male Female   CH   0% Impaired ? 1.39 ? 1.40 ? 1.36 ? 1.30 ? 1.33 ? 1.27 ? 1.21 ? 1.15   CI   1-19% Impaired 1.11-1.38 1.12-1.39 1.09-1.35 1.04-1.29 1.06-1.32 1.01-1.26 0.96-1.20 0.92-1.14   CJ   20-39% Impaired 0.83-1.10 0.84-1.11 0.82-1.08 0.78-1.03 0.80-1.05 0.76-1.00 0.72-0.95 0.69-0.91   CK   40-59% Impaired 0.56-0.82 0.57-0.83 0.54-0.81 0.52-0.77 0.53-0.79 0.51-0.75 0.48-0.71 0.46-0.68   CL   60-79% Impaired 0.28-0.55 0.28-0.56 0.27-0.53 0.26-0.51 0.27-0.52 0.25-0.50 0.24-0.49 0.23-0.45   CM   80-99% Impaired 0.01-0.28 < 0.01-0.28 < 0.01-0.27 < 0.01-0.26 0.01-0.27 0.01-0.24 0.01-0.23 0.01-0.22   CN   100% Impaired Cannot Perform   Minimal Detectable Change (MDC-90) = 0.1 m/s  Carlos LANDERS \"Comfortable and maximum walking speed of adults aged 20-79 years: reference values and determinants. \" Age and Agin Volume 26(1):15-9. Lillie Rivers. \"Age- and gender-related test performance in community-dwelling elderly people: Six-Minute Walk Test, Craig Balance Scale, Timed Up & Go Test, and gait speeds. \" Physical Therapy: 2002 Volume 82(2):128-37. Harsha CORTES, Quentin MASTERS, Abner LAMBD, Jac YO. \"Assessing stability and change of four performance measures: a longitudinal study evaluating outcome following total hip and knee arthroplasty. \" [facility] Musculoskeletal Disorders: 2005 Volume 6(3). Rubina Peña, PhD; Pj Lenz, PhD. Dharmesh Arevalo Paper: \"Walking Speed: the Sixth Vital Sign\" Journal of Geriatric Physical Therapy: 2009 - Volume 32 - Issue 2 - p 25 . In compliance with CMSs Claims Based Outcome Reporting, the following G-code set was chosen for this patient based on their primary functional limitation being treated: The outcome measure chosen to determine the severity of the functional limitation was the 10 MWT with a score of 0.82 m/s which was correlated with the impairment scale. ? Mobility - Walking and Moving Around:     - CURRENT STATUS: CJ - 20%-39% impaired, limited or restricted    - GOAL STATUS: CJ - 20%-39% impaired, limited or restricted    - D/C STATUS:  CJ - 20%-39% impaired, limited or restricted    Physical Therapy Evaluation Charge Determination   History Examination Presentation Decision-Making   LOW Complexity : Zero comorbidities / personal factors that will impact the outcome / POC LOW Complexity : 1-2 Standardized tests and measures addressing body structure, function, activity limitation and / or participation in recreation  LOW Complexity : Stable, uncomplicated  LOW Complexity : FOTO score of       Based on the above components, the patient evaluation is determined to be of the following complexity level: LOW     Pain:  Pain Scale 1: Numeric (0 - 10)  Pain Intensity 1: 0     Activity Tolerance:   WFL  Please refer to the flowsheet for vital signs taken during this treatment.   After treatment:   []         Patient left in no apparent distress sitting up in chair  [x]         Patient left in no apparent distress in bed  [x]         Call bell left within reach  [x]         Nursing notified  []         Caregiver present  []         Bed alarm activated        Thank you for this referral.  Ellie Gonzalez PT, DPT   Time Calculation: 20 mins

## 2017-12-29 NOTE — ED TRIAGE NOTES
Patient has had flu like symptoms for several weeks and he was seen here yesterday for same. He is from [facility].

## 2017-12-29 NOTE — ED NOTES
Report called to Nancy Post at [facility] [place]. His family will come to pick him up to take him back. He has been made aware.